# Patient Record
Sex: FEMALE | Race: WHITE | HISPANIC OR LATINO | Employment: FULL TIME | URBAN - METROPOLITAN AREA
[De-identification: names, ages, dates, MRNs, and addresses within clinical notes are randomized per-mention and may not be internally consistent; named-entity substitution may affect disease eponyms.]

---

## 2017-01-14 DIAGNOSIS — J30.1 SEASONAL ALLERGIC RHINITIS DUE TO POLLEN: ICD-10-CM

## 2017-01-16 ENCOUNTER — PATIENT MESSAGE (OUTPATIENT)
Dept: MEDICAL GROUP | Facility: CLINIC | Age: 47
End: 2017-01-16

## 2017-01-16 RX ORDER — MONTELUKAST SODIUM 10 MG/1
TABLET ORAL
Qty: 30 TAB | Refills: 6 | Status: SHIPPED | OUTPATIENT
Start: 2017-01-16 | End: 2017-09-09 | Stop reason: SDUPTHER

## 2017-01-23 ENCOUNTER — PATIENT MESSAGE (OUTPATIENT)
Dept: MEDICAL GROUP | Facility: CLINIC | Age: 47
End: 2017-01-23

## 2017-01-23 DIAGNOSIS — F90.9 ATTENTION DEFICIT DISORDER OF ADULT WITH HYPERACTIVITY: ICD-10-CM

## 2017-01-23 RX ORDER — DEXTROAMPHETAMINE SACCHARATE, AMPHETAMINE ASPARTATE, DEXTROAMPHETAMINE SULFATE AND AMPHETAMINE SULFATE 3.75; 3.75; 3.75; 3.75 MG/1; MG/1; MG/1; MG/1
15 TABLET ORAL 2 TIMES DAILY
Qty: 60 TAB | Refills: 0 | Status: CANCELLED | OUTPATIENT
Start: 2017-01-23

## 2017-01-23 RX ORDER — BUPROPION HYDROCHLORIDE 150 MG/1
150 TABLET ORAL EVERY MORNING
Qty: 30 TAB | Refills: 3 | Status: SHIPPED | OUTPATIENT
Start: 2017-01-23 | End: 2017-04-14 | Stop reason: SDUPTHER

## 2017-01-23 NOTE — TELEPHONE ENCOUNTER
Was the patient seen in the last year in this department? Yes . Last visit was 10/06/16. Pt had a visit Friday, 1/20/17 but had to reschedule to 2/1/17. Pt states she has been out since Saturday and is sick to her stomach. She wants something to help whether that's Adderall or something else to help with withdrawals.     Does patient have an active prescription for medications requested? No     Received Request Via: Patient

## 2017-01-23 NOTE — TELEPHONE ENCOUNTER
----- Message from Your Healthcare Team sent at 1/23/2017  2:17 PM PST -----  Regarding: Prescription Question  Contact: [phone]  I could not make my appointment on Friday to get Adderall refills and I ran completely out as of Saturday morning. The next available appointment wasn't some time until early February.   I was going to talk to you about going off of Adderall at Friday's appointment. I am really sick from not taking it for a couple days and I need to find out if there's something I can get to help me while I'm trying to go off of this.  Right now I cannot even function and I need to go to work tomorrow.

## 2017-01-23 NOTE — TELEPHONE ENCOUNTER
Please see below message from patient. I did send a message earlier to you because she had called earlier. Please advise, thank you.

## 2017-01-24 NOTE — TELEPHONE ENCOUNTER
From: Aruna Simth  To: Sivan Jefferson M.D.  Sent: 1/23/2017 5:53 PM PST  Subject: RE:substitute    And this will help with the withdrawal symptoms of shaking, rapid heartbeat, throwing up & diarrhea? I just want to make sure. Also, it is ok to take with the Serotonin?  ----- Message -----  From: Sivan Jefferson M.D.  Sent: 1/23/2017 5:16 PM PST  To: Aruna Smith  Subject: substitute    I suggest trying wellbutrin. In low to moderate dose it can be helpful like the adderall. In high dose it could give you anxiety. I have sent it to [facility].

## 2017-02-09 ENCOUNTER — OFFICE VISIT (OUTPATIENT)
Dept: URGENT CARE | Facility: PHYSICIAN GROUP | Age: 47
End: 2017-02-09
Payer: COMMERCIAL

## 2017-02-09 VITALS
HEART RATE: 79 BPM | TEMPERATURE: 98.8 F | HEIGHT: 64 IN | WEIGHT: 200 LBS | DIASTOLIC BLOOD PRESSURE: 86 MMHG | OXYGEN SATURATION: 94 % | SYSTOLIC BLOOD PRESSURE: 124 MMHG | BODY MASS INDEX: 34.15 KG/M2 | RESPIRATION RATE: 16 BRPM

## 2017-02-09 DIAGNOSIS — R11.2 NON-INTRACTABLE VOMITING WITH NAUSEA, UNSPECIFIED VOMITING TYPE: ICD-10-CM

## 2017-02-09 LAB
APPEARANCE UR: CLEAR
BILIRUB UR STRIP-MCNC: NORMAL MG/DL
COLOR UR AUTO: YELLOW
GLUCOSE UR STRIP.AUTO-MCNC: NORMAL MG/DL
KETONES UR STRIP.AUTO-MCNC: NORMAL MG/DL
LEUKOCYTE ESTERASE UR QL STRIP.AUTO: NORMAL
NITRITE UR QL STRIP.AUTO: NORMAL
PH UR STRIP.AUTO: 5 [PH] (ref 5–8)
PROT UR QL STRIP: NORMAL MG/DL
RBC UR QL AUTO: NORMAL
SP GR UR STRIP.AUTO: 1.03
UROBILINOGEN UR STRIP-MCNC: NORMAL MG/DL

## 2017-02-09 PROCEDURE — 99214 OFFICE O/P EST MOD 30 MIN: CPT | Performed by: EMERGENCY MEDICINE

## 2017-02-09 PROCEDURE — 81002 URINALYSIS NONAUTO W/O SCOPE: CPT | Performed by: EMERGENCY MEDICINE

## 2017-02-09 RX ORDER — ONDANSETRON 4 MG/1
4 TABLET, ORALLY DISINTEGRATING ORAL ONCE
Status: COMPLETED | OUTPATIENT
Start: 2017-02-09 | End: 2017-02-09

## 2017-02-09 RX ORDER — ONDANSETRON 4 MG/1
4 TABLET, ORALLY DISINTEGRATING ORAL EVERY 8 HOURS PRN
Qty: 10 TAB | Refills: 0 | Status: SHIPPED | OUTPATIENT
Start: 2017-02-09 | End: 2017-04-24 | Stop reason: SDUPTHER

## 2017-02-09 RX ADMIN — ONDANSETRON 4 MG: 4 TABLET, ORALLY DISINTEGRATING ORAL at 14:38

## 2017-02-09 ASSESSMENT — ENCOUNTER SYMPTOMS
DIARRHEA: 1
EYE DISCHARGE: 0
VOMITING: 1
MYALGIAS: 1
NECK PAIN: 0
CHILLS: 0
ABDOMINAL PAIN: 1
HEADACHES: 1
SENSORY CHANGE: 0
NAUSEA: 1
COUGH: 0
EYE PAIN: 0
NERVOUS/ANXIOUS: 0
FEVER: 1
HEMOPTYSIS: 0
CONSTIPATION: 0

## 2017-02-09 NOTE — MR AVS SNAPSHOT
"        Aruna Smith   2017 12:40 PM   Office Visit   MRN: [id number]    Department:  [facility]   Dept Phone:  [phone]    Description:  Female : 1970   Provider:  MELIDA Baca M.D.           Reason for Visit     N/V diarrhea, headache, body aches, sneezing fits, no appetite, nasal congestion, ear pressure, dizzyness and fatigue x3 days      Allergies as of 2017     Allergen Noted Reactions    Xanax [Alprazolam Xr] 2014       shaking      You were diagnosed with     Non-intractable vomiting with nausea, unspecified vomiting type   [[id number]]         Vital Signs     Blood Pressure Pulse Temperature Respirations Height Weight    124/86 mmHg 79 37.1 °C (98.8 °F) 16 1.626 m (5' 4.02\") 90.719 kg (200 lb)    Body Mass Index Oxygen Saturation Smoking Status             34.31 kg/m2 94% Former Smoker         Basic Information     Date Of Birth Sex Race Ethnicity Preferred Language    1970 Female White Non- English      Your appointments     2017  4:00 PM   Established Patient with Sivan Jefferson M.D.   [facility]    [address] [place] [address] [address] [place] [id number] [phone]           You will be receiving a confirmation call a few days before your appointment from our automated call confirmation system.              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 Review all of your home medications and newly ordered medications with your provider and/or pharmacist. Follow medication instructions as directed by your provider and/or pharmacist. Please keep your medication list with you and share with your provider. Update the information when medications are discontinued, doses are changed, or new medications (including over-the-counter products) are added; and carry medication information at all times in the event of emergency situations     Allergies:  XANAX - (reactions not documented)               Medications  Valid as of: February 09, 2017 -  2:09 PM    Generic Name Brand Name Tablet Size Instructions for use    Amphetamine-Dextroamphetamine (Tab) ADDERALL 15 MG Take 1 Tab by mouth 2 times a day. Seen 10/6/16, renewal of chronic medication, do not fill until 12/21/16        BuPROPion HCl (TABLET SR 24 HR) WELLBUTRIN  MG Take 1 Tab by mouth every morning.        Hydrocodone-Acetaminophen (Tab) NORCO 5-325 MG Take 1-2 Tabs by mouth every four hours as needed.        HydrOXYzine HCl (Tab) ATARAX 10 MG TAKE ONE TABLET BY MOUTH THREE TIMES A DAY AS NEEDED FOR ANXIETY    GENERIC FOR ATARAX        Loratadine   Take  by mouth.        LORazepam (Tab) ATIVAN 0.5 MG Take 1 Tab by mouth every 8 hours as needed for Anxiety.        Montelukast Sodium (Tab) SINGULAIR 10 MG TAKE ONE TABLET BY MOUTH DAILY        Ondansetron (TABLET DISPERSIBLE) ZOFRAN ODT 4 MG Take 1 Tab by mouth every 8 hours as needed for Nausea/Vomiting.        RaNITidine HCl (Tab)  ZANTAC 150 MG TAKE ONE TABLET BY MOUTH TWICE A DAY        Sertraline HCl (Tab) ZOLOFT 50 MG Take 1 Tab by mouth every day.        .                 Medicines prescribed today were sent to:     [id number] [place] [address] [place] [address]    Phone: [phone] Fax: [phone]    Open 24 Hours?: No      Medication refill instructions:       If your prescription bottle indicates you have medication refills left, it is not necessary to call your provider’s office. Please contact your pharmacy and they will refill your medication.    If your prescription bottle indicates you do not have any refills left, you may request refills at any time through one of the following ways: The online [employer] system (except Urgent Care), by calling your provider’s office, or by asking your pharmacy to contact your provider’s office with a refill request. Medication refills are processed only during regular business hours and may not be available until the next business day. Your provider may request additional information or to have a follow-up visit with you prior to refilling your medication.   *Please Note: Medication refills are assigned a new Rx number when refilled electronically. Your pharmacy may indicate that no refills were authorized even though a new prescription for the same medication is available at the pharmacy. Please request the medicine by name with the pharmacy before contacting your provider for a refill.        Your To Do List     Future Labs/Procedures Complete By Expires    CULTURE STOOL  As directed 2/9/2018         [employer] Access Code: Activation code not generated  Current [employer] Status: Active

## 2017-02-09 NOTE — Clinical Note
February 9, 2017        Aruna Smith  [address] [place] [address]        Dear Aruna:    Please ask to be allowed to stay home from  work for the next two days,    If you have any questions or concerns, please don't hesitate to call.        Sincerely,        MELIDA Baca M.D.    Electronically Signed

## 2017-02-09 NOTE — PROGRESS NOTES
Subjective:      Aruna Smith is a 46 y.o. female who presents with N/V            Gastroenteritis   This is a new problem. The current episode started in the past 7 days. The problem has been gradually worsening. The emesis has an appearance of bile. There has been no fever. Associated symptoms include abdominal pain, diarrhea, a fever, headaches and myalgias. Pertinent negatives include no chills or coughing. Associated symptoms comments: Diarrhea before vomiting,  with diarrhea. Patient has associated headaches and bodyaches here pain lack of appetite.. Risk factors include ill contacts ( with diarrhea). She has tried increased fluids for the symptoms.     Allergies   Allergen Reactions   • Xanax [Alprazolam Xr]      shaking     Social History     Social History   • Marital Status:      Spouse Name: N/A   • Number of Children: N/A   • Years of Education: N/A     Occupational History   • Not on file.     Social History Main Topics   • Smoking status: Former Smoker -- 0.50 packs/day for 4 years     Types: Cigarettes     Quit date: 09/01/1988   • Smokeless tobacco: Never Used   • Alcohol Use: 0.0 oz/week     0 Standard drinks or equivalent per week      Comment: occasional   • Drug Use: No   • Sexual Activity: Not on file     Other Topics Concern   • Not on file     Social History Narrative     Past Medical History   Diagnosis Date   • Allergic rhinitis    • Anxiety    • Seasonal allergies    • Family history of diabetes mellitus type II    • Other specified symptom associated with female genital organs    • Attention deficit disorder of adult with hyperactivity    • Shingles 5/2014   • GERD without esophagitis    • Colitis, indeterminate    ..    Tinnitis  Current Outpatient Prescriptions on File Prior to Visit   Medication Sig Dispense Refill   • buPROPion (WELLBUTRIN XL) 150 MG XL tablet Take 1 Tab by mouth every morning. 30 Tab 3   • montelukast (SINGULAIR) 10 MG Tab TAKE ONE TABLET BY  "MOUTH DAILY 30 Tab 6   • ranitidine (ZANTAC) 150 MG Tab TAKE ONE TABLET BY MOUTH TWICE A DAY 60 Tab 6   • sertraline (ZOLOFT) 50 MG Tab Take 1 Tab by mouth every day. 30 Tab 6   • hydrOXYzine (ATARAX) 10 MG Tab TAKE ONE TABLET BY MOUTH THREE TIMES A DAY AS NEEDED FOR ANXIETY    GENERIC FOR ATARAX 90 Tab 4   • lorazepam (ATIVAN) 0.5 MG Tab Take 1 Tab by mouth every 8 hours as needed for Anxiety. 60 Tab 5   • hydrocodone-acetaminophen (NORCO) 5-325 MG Tab per tablet Take 1-2 Tabs by mouth every four hours as needed. 8 Tab 0   • amphetamine-dextroamphetamine (ADDERALL) 15 MG tablet Take 1 Tab by mouth 2 times a day. Seen 10/6/16, renewal of chronic medication, do not fill until 12/21/16 60 Tab 0     No current facility-administered medications on file prior to visit.   family history includes Diabetes in her mother; Heart Disease in her father; Hypertension in her brother, maternal grandfather, and mother.    Review of Systems   Constitutional: Positive for fever. Negative for chills and malaise/fatigue.   HENT: Positive for congestion.    Eyes: Negative for pain and discharge.   Respiratory: Negative for cough and hemoptysis.    Gastrointestinal: Positive for nausea, vomiting, abdominal pain and diarrhea. Negative for constipation.   Genitourinary: Negative.    Musculoskeletal: Positive for myalgias. Negative for neck pain.   Neurological: Positive for headaches. Negative for sensory change.   Psychiatric/Behavioral: The patient is not nervous/anxious.           Objective:     /86 mmHg  Pulse 79  Temp(Src) 37.1 °C (98.8 °F)  Resp 16  Ht 1.626 m (5' 4.02\")  Wt 90.719 kg (200 lb)  BMI 34.31 kg/m2  SpO2 94%     Physical Exam   Constitutional: She appears well-developed and well-nourished.   HENT:   Head: Normocephalic and atraumatic.   Right Ear: External ear normal.   Left Ear: External ear normal.   TMs appears normal.   Eyes: Right eye exhibits no discharge. Left eye exhibits no discharge.   Neck: No " thyromegaly present.   Cardiovascular: Normal rate and regular rhythm.    Pulmonary/Chest: Effort normal. No respiratory distress. She has no wheezes. She has no rales.   Abdominal: She exhibits no distension. There is no tenderness.   Musculoskeletal: Normal range of motion.   Neurological: She is alert.   Skin: She is not diaphoretic.   Psychiatric: She has a normal mood and affect. Her behavior is normal.   Vitals reviewed.              Assessment/Plan:     Diagnosis acute gastroenteritis    Patient will stand clear liquids for the next 24 hours of dairy products for 5 days. She's been given a prescription: Zofran 4 mg ODT patient able to hold down liquids after the medicine. She will also be given a prescription to use on a regular basis if her symptoms persist. She will return for reevaluation greenish stool in the of her diarrhea persists will call her with results. Patient was given a work note for the next 2 days off.

## 2017-02-25 RX ORDER — LORAZEPAM 0.5 MG/1
TABLET ORAL
Qty: 60 TAB | Refills: 3 | Status: SHIPPED
Start: 2017-02-25 | End: 2017-04-14 | Stop reason: SDUPTHER

## 2017-03-27 RX ORDER — HYDROXYZINE HYDROCHLORIDE 10 MG/1
TABLET, FILM COATED ORAL
Qty: 90 TAB | Refills: 0 | Status: SHIPPED | OUTPATIENT
Start: 2017-03-27 | End: 2018-08-23

## 2017-04-14 ENCOUNTER — OFFICE VISIT (OUTPATIENT)
Dept: MEDICAL GROUP | Facility: CLINIC | Age: 47
End: 2017-04-14
Payer: COMMERCIAL

## 2017-04-14 VITALS
OXYGEN SATURATION: 95 % | SYSTOLIC BLOOD PRESSURE: 136 MMHG | HEART RATE: 70 BPM | RESPIRATION RATE: 16 BRPM | HEIGHT: 64 IN | WEIGHT: 206 LBS | DIASTOLIC BLOOD PRESSURE: 80 MMHG | BODY MASS INDEX: 35.17 KG/M2 | TEMPERATURE: 98.2 F

## 2017-04-14 DIAGNOSIS — E66.9 OBESITY, CLASS II, BMI 35-39.9: ICD-10-CM
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
## 2017-04-14 DIAGNOSIS — F41.9 ANXIETY: ICD-10-CM

## 2017-04-14 DIAGNOSIS — S61.258A CAT BITE OF INDEX FINGER, INITIAL ENCOUNTER: ICD-10-CM

## 2017-04-14 DIAGNOSIS — F90.9 ATTENTION DEFICIT DISORDER OF ADULT WITH HYPERACTIVITY: ICD-10-CM

## 2017-04-14 PROCEDURE — 99213 OFFICE O/P EST LOW 20 MIN: CPT | Performed by: FAMILY MEDICINE

## 2017-04-14 RX ORDER — BUPROPION HYDROCHLORIDE 150 MG/1
150 TABLET ORAL EVERY MORNING
Qty: 30 TAB | Refills: 6 | Status: SHIPPED | OUTPATIENT
Start: 2017-04-14 | End: 2017-04-20 | Stop reason: SDUPTHER

## 2017-04-14 RX ORDER — LORAZEPAM 0.5 MG/1
0.5 TABLET ORAL EVERY 8 HOURS PRN
Qty: 60 TAB | Refills: 5 | Status: SHIPPED | OUTPATIENT
Start: 2017-04-14 | End: 2017-06-10 | Stop reason: SDUPTHER

## 2017-04-14 ASSESSMENT — PATIENT HEALTH QUESTIONNAIRE - PHQ9: CLINICAL INTERPRETATION OF PHQ2 SCORE: 1

## 2017-04-14 NOTE — PROGRESS NOTES
CC: Bite, ADHD, medication renewal, anxiety    HPI:   Aruna presents today with the following.    1. Cat bite of index finger, initial encounter  Was 3/23/17, her own immunized cat.  The cat has not been ill.  Her tetanus was up to date.  She has recovered well except for the right index finger.  This is still swollen and lacking full ROM but is improving.  She states at one point she could not even bend the finger. Now she can bend it almost completely. Denies drainage.    2. Attention deficit disorder of adult with hyperactivity  The bupropion has been helpful.  It is working for her.      3. Anxiety  This is a chronic problem. This has been worse this last year with the passing of her mother who she was very close to.  She feels she is doing better and finds her family and her Scientologist to be very good support. Denies depression. Feels she is progressing with her grief.    4. Obesity, Class II, BMI 35-39.9 (CMS-[facility])  She is still much lower than her her highest weight. She finds she is slipping and plateauing and is planning on increasing her exercise.        Patient Active Problem List    Diagnosis Date Noted   • GERD without esophagitis    • Colitis, indeterminate    • Obesity, Class II, BMI 35-39.9 (CMS-[facility]) 07/30/2014   • Attention deficit disorder of adult with hyperactivity    • Family history of diabetes mellitus type II    • Seasonal allergic rhinitis    • Seasonal allergies    • Anxiety        Current Outpatient Prescriptions   Medication Sig Dispense Refill   • buPROPion (WELLBUTRIN XL) 150 MG XL tablet Take 1 Tab by mouth every morning. 30 Tab 6   • lorazepam (ATIVAN) 0.5 MG Tab Take 1 Tab by mouth every 8 hours as needed for Anxiety. 60 Tab 5   • hydrOXYzine (ATARAX) 10 MG Tab TAKE ONE TABLET BY MOUTH THREE TIMES A DAY AS NEEDED FOR ANXIETY   (GENERIC ATARAX) 90 Tab 0   • Loratadine (CLARITIN PO) Take  by mouth.     • ondansetron (ZOFRAN ODT) 4 MG TABLET DISPERSIBLE Take 1 Tab by mouth every 8 hours as  "needed for Nausea/Vomiting. 10 Tab 0   • montelukast (SINGULAIR) 10 MG Tab TAKE ONE TABLET BY MOUTH DAILY 30 Tab 6   • ranitidine (ZANTAC) 150 MG Tab TAKE ONE TABLET BY MOUTH TWICE A DAY 60 Tab 6   • sertraline (ZOLOFT) 50 MG Tab Take 1 Tab by mouth every day. 30 Tab 6     No current facility-administered medications for this visit.         Allergies as of 04/14/2017 - Josué as Reviewed 04/14/2017   Allergen Reaction Noted   • Xanax [alprazolam xr]  05/09/2014        ROS: As per HPI.    /80 mmHg  Pulse 70  Temp(Src) 36.8 °C (98.2 °F)  Resp 16  Ht 1.626 m (5' 4.02\")  Wt 93.441 kg (206 lb)  BMI 35.34 kg/m2  SpO2 95%    Physical Exam:  Gen:         Alert and oriented, No apparent distress.  Lucid and fluent.  Neck:       Full range of motion, no JVD or carotid bruits appreciated.   Lungs:     Clear to auscultation bilaterally  CV:          Regular rate and rhythm. No murmurs, rubs or gallops.               Ext:          No clubbing, cyanosis, edema.  Right index finger violaceous fusiform swelling, ROM is nearly full, patient states this is much worse.      Assessment and Plan.   46 y.o. female with the following issues.    1. Cat bite of index finger, initial encounter  This appears to be stable and not currently infected though there is inflammation. Per the history this is improving. She will continue to work on range of motion and will let me know if the redness or swelling worsens or if she gets increased pain.    2. Attention deficit disorder of adult with hyperactivity  This medication has been very successful. She is glad to no longer be on the controlled substances. Discussed that seeing each other every 6 months since she is doing well would be appropriate.  - buPROPion (WELLBUTRIN XL) 150 MG XL tablet; Take 1 Tab by mouth every morning.  Dispense: 30 Tab; Refill: 6    3. Anxiety  The medication continues to be very helpful. She tries to average 1-1/2 a day but has been on this regimen for a long " time. Denies rebound anxiety or depression associated with the medication  - lorazepam (ATIVAN) 0.5 MG Tab; Take 1 Tab by mouth every 8 hours as needed for Anxiety.  Dispense: 60 Tab; Refill: 5    4. Obesity, Class II, BMI 35-39.9 (CMS-[facility])  She is getting back to exercising.    - Patient identified as having weight management issue.  Appropriate orders and counseling given.    Health maintenance is reviewed, the mammogram is scheduled

## 2017-04-20 ENCOUNTER — PATIENT MESSAGE (OUTPATIENT)
Dept: MEDICAL GROUP | Facility: CLINIC | Age: 47
End: 2017-04-20

## 2017-04-20 DIAGNOSIS — F90.9 ATTENTION DEFICIT DISORDER OF ADULT WITH HYPERACTIVITY: ICD-10-CM

## 2017-04-20 RX ORDER — BUPROPION HYDROCHLORIDE 300 MG/1
300 TABLET ORAL EVERY MORNING
Qty: 30 TAB | Refills: 6 | Status: SHIPPED | OUTPATIENT
Start: 2017-04-20 | End: 2017-11-18 | Stop reason: SDUPTHER

## 2017-04-20 NOTE — TELEPHONE ENCOUNTER
From: Aruna Smith  To: Sivan Jefferson M.D.  Sent: 4/20/2017 12:50 PM PDT  Subject: RE:symptoms certainly suggest depression    Yes I keep hoping I won't wake up.  ----- Message -----  From: Sivan Jefferson M.D.  Sent: 4/20/2017 10:26 AM PDT  To: Aruna Smith  Subject: symptoms certainly suggest depression    Those symptoms certainly can be seen in depression. How long has this been going on? Are you getting any thoughts that are scaring you?

## 2017-04-21 NOTE — TELEPHONE ENCOUNTER
From: Aruna Smith  To: Sivan Jefferson M.D.  Sent: 4/20/2017 5:22 PM PDT  Subject: RE:letter    ?Is it possible just to write it here in a message. I can print it off here.  ----- Message -----  From: Sivan Jefferson M.D.  Sent: 4/20/2017 2:33 PM PDT  To: Aruna Smith  Subject: letter    I have written a letter and will sign it. Not quite sure how to fax it over to [place] and get it to the , but we will figure this out.

## 2017-04-24 ENCOUNTER — OFFICE VISIT (OUTPATIENT)
Dept: MEDICAL GROUP | Facility: CLINIC | Age: 47
End: 2017-04-24
Payer: COMMERCIAL

## 2017-04-24 ENCOUNTER — HOSPITAL ENCOUNTER (OUTPATIENT)
Facility: MEDICAL CENTER | Age: 47
End: 2017-04-24
Attending: NURSE PRACTITIONER
Payer: COMMERCIAL

## 2017-04-24 ENCOUNTER — PATIENT MESSAGE (OUTPATIENT)
Dept: MEDICAL GROUP | Facility: CLINIC | Age: 47
End: 2017-04-24

## 2017-04-24 VITALS
SYSTOLIC BLOOD PRESSURE: 130 MMHG | DIASTOLIC BLOOD PRESSURE: 82 MMHG | HEART RATE: 80 BPM | RESPIRATION RATE: 14 BRPM | BODY MASS INDEX: 33.63 KG/M2 | HEIGHT: 64 IN | WEIGHT: 197 LBS | TEMPERATURE: 98.8 F | OXYGEN SATURATION: 95 %

## 2017-04-24 DIAGNOSIS — K52.3 COLITIS, INDETERMINATE: ICD-10-CM

## 2017-04-24 DIAGNOSIS — R11.2 NAUSEA AND VOMITING, INTRACTABILITY OF VOMITING NOT SPECIFIED, UNSPECIFIED VOMITING TYPE: ICD-10-CM

## 2017-04-24 LAB
APPEARANCE UR: CLEAR
BILIRUB UR STRIP-MCNC: NORMAL MG/DL
COLOR UR AUTO: YELLOW
GLUCOSE UR STRIP.AUTO-MCNC: NEGATIVE MG/DL
KETONES UR STRIP.AUTO-MCNC: 80 MG/DL
LEUKOCYTE ESTERASE UR QL STRIP.AUTO: NEGATIVE
NITRITE UR QL STRIP.AUTO: NEGATIVE
PH UR STRIP.AUTO: 7 [PH] (ref 5–8)
PROT UR QL STRIP: NEGATIVE MG/DL
RBC UR QL AUTO: NORMAL
SP GR UR STRIP.AUTO: 1.02
UROBILINOGEN UR STRIP-MCNC: NEGATIVE MG/DL

## 2017-04-24 PROCEDURE — 81002 URINALYSIS NONAUTO W/O SCOPE: CPT | Performed by: NURSE PRACTITIONER

## 2017-04-24 PROCEDURE — 81001 URINALYSIS AUTO W/SCOPE: CPT

## 2017-04-24 PROCEDURE — 99214 OFFICE O/P EST MOD 30 MIN: CPT | Performed by: NURSE PRACTITIONER

## 2017-04-24 PROCEDURE — 87086 URINE CULTURE/COLONY COUNT: CPT

## 2017-04-24 RX ORDER — ONDANSETRON 4 MG/1
4 TABLET, ORALLY DISINTEGRATING ORAL EVERY 8 HOURS PRN
Qty: 10 TAB | Refills: 1 | Status: SHIPPED | OUTPATIENT
Start: 2017-04-24 | End: 2017-11-30

## 2017-04-24 NOTE — MR AVS SNAPSHOT
"        Aruna Banegasn   2017 2:20 PM   Office Visit   MRN: [id number]    Department:  [facility]   Dept Phone:  [phone]    Description:  Female : 1970   Provider:  GAYATHRI Jacobsen           Reason for Visit     Emesis nausea and vomiting x friday/saturday      Allergies as of 2017     Allergen Noted Reactions    Xanax [Alprazolam Xr] 2014       shaking      You were diagnosed with     Nausea and vomiting, intractability of vomiting not specified, unspecified vomiting type   [[id number]]         Vital Signs     Blood Pressure Pulse Temperature Respirations Height Weight    130/82 mmHg 80 37.1 °C (98.8 °F) 14 1.626 m (5' 4.02\") 89.359 kg (197 lb)    Body Mass Index Oxygen Saturation Breastfeeding? Smoking Status          33.80 kg/m2 95% No Former Smoker        Basic Information     Date Of Birth Sex Race Ethnicity Preferred Language    1970 Female White Non- English      Your appointments     May 17, 2017  3:40 PM   MA [id number] with [facility] MG 3   [facility] ([address])    [address]  [facility] [address] [phone]           No deodorant, powder, perfume or lotion under the arm or breast area.              Problem List              ICD-10-CM Priority Class Noted - Resolved    Anxiety F41.9   Unknown - Present    Seasonal allergic rhinitis J30.2   Unknown - Present    Seasonal allergies J30.2   Unknown - Present    Family history of diabetes mellitus type II Z83.3   Unknown - Present    Attention deficit disorder of adult with hyperactivity F90.0   Unknown - Present    Obesity, Class II, BMI 35-39.9 (CMS-[facility]) E66.01   2014 - Present    GERD without esophagitis K21.9   Unknown - Present    Colitis, indeterminate K52.3   Unknown - Present      Health Maintenance        Date Due Completion Dates    MAMMOGRAM 2015, 2011, 3/9/2010, 3/9/2010, 2009, 2009, 2009    IMM DTaP/Tdap/Td Vaccine " (2 - Td) 2/21/2024 2/21/2014    COLONOSCOPY 4/19/2026 4/19/2016            Results     POCT Urinalysis      Component Value Standard Range & Units    POC Color Yellow Negative    POC Appearance clear Negative    POC Leukocyte Esterase Negative Negative    POC Nitrites Negative Negative    POC Urobiligen Negative Negative (0.2) mg/dL    POC Protein Negative Negative mg/dL    POC Urine PH 7.0 5.0 - 8.0    POC Blood Small Negative    POC Specific Gravity 1.020 <1.005 - >1.030    POC Ketones 80 Negative mg/dL    POC Biliruben Moderate Negative mg/dL    POC Glucose Negative Negative mg/dL                        Current Immunizations     Influenza Vaccine Pediatric 10/7/2010    Influenza Vaccine Quad Inj (Pf) 12/15/2015, 10/14/2014    Tdap Vaccine 2/21/2014      Below and/or attached are the medications your provider expects you to take. Review all of your home medications and newly ordered medications with your provider and/or pharmacist. Follow medication instructions as directed by your provider and/or pharmacist. Please keep your medication list with you and share with your provider. Update the information when medications are discontinued, doses are changed, or new medications (including over-the-counter products) are added; and carry medication information at all times in the event of emergency situations     Allergies:  XANAX - (reactions not documented)               Medications  Valid as of: April 24, 2017 -  4:14 PM    Generic Name Brand Name Tablet Size Instructions for use    BuPROPion HCl (TABLET SR 24 HR) WELLBUTRIN  MG Take 1 Tab by mouth every morning.        HydrOXYzine HCl (Tab) ATARAX 10 MG TAKE ONE TABLET BY MOUTH THREE TIMES A DAY AS NEEDED FOR ANXIETY   (GENERIC ATARAX)        Loratadine   Take  by mouth.        LORazepam (Tab) ATIVAN 0.5 MG Take 1 Tab by mouth every 8 hours as needed for Anxiety.        Montelukast Sodium (Tab) SINGULAIR 10 MG TAKE ONE TABLET BY MOUTH DAILY        Ondansetron  (TABLET DISPERSIBLE) ZOFRAN ODT 4 MG Take 1 Tab by mouth every 8 hours as needed for Nausea/Vomiting.        RaNITidine HCl (Tab) ZANTAC 150 MG TAKE ONE TABLET BY MOUTH TWICE A DAY        Sertraline HCl (Tab) ZOLOFT 50 MG Take 1 Tab by mouth every day.        .                 Medicines prescribed today were sent to:     [id number] - [place] - [address] [place] [address]    Phone: [phone] Fax: [phone]    Open 24 Hours?: No      Medication refill instructions:       If your prescription bottle indicates you have medication refills left, it is not necessary to call your provider’s office. Please contact your pharmacy and they will refill your medication.    If your prescription bottle indicates you do not have any refills left, you may request refills at any time through one of the following ways: The online [employer] system (except Urgent Care), by calling your provider’s office, or by asking your pharmacy to contact your provider’s office with a refill request. Medication refills are processed only during regular business hours and may not be available until the next business day. Your provider may request additional information or to have a follow-up visit with you prior to refilling your medication.   *Please Note: Medication refills are assigned a new Rx number when refilled electronically. Your pharmacy may indicate that no refills were authorized even though a new prescription for the same medication is available at the pharmacy. Please request the medicine by name with the pharmacy before contacting your provider for a refill.        Your To Do List     Future Labs/Procedures Complete By Expires    URINALYSIS,CULTURE IF INDICATED  As directed 4/25/2018         [employer] Access Code: Activation code not generated  Current [employer] Status: Active

## 2017-04-25 LAB
AMBIGUOUS DTTM AMBI4: NORMAL
APPEARANCE UR: ABNORMAL
BACTERIA #/AREA URNS HPF: ABNORMAL /HPF
BILIRUB UR QL STRIP.AUTO: NEGATIVE
CAOX CRY #/AREA URNS HPF: ABNORMAL /HPF
COLOR UR: YELLOW
CULTURE IF INDICATED INDCX: YES UA CULTURE
EPI CELLS #/AREA URNS HPF: ABNORMAL /HPF
GLUCOSE UR STRIP.AUTO-MCNC: NEGATIVE MG/DL
KETONES UR STRIP.AUTO-MCNC: 10 MG/DL
LEUKOCYTE ESTERASE UR QL STRIP.AUTO: ABNORMAL
MICRO URNS: ABNORMAL
MUCOUS THREADS #/AREA URNS HPF: ABNORMAL /HPF
NITRITE UR QL STRIP.AUTO: NEGATIVE
PH UR STRIP.AUTO: 5.5 [PH]
PROT UR QL STRIP: 30 MG/DL
RBC # URNS HPF: ABNORMAL /HPF
RBC UR QL AUTO: NEGATIVE
SIGNIFICANT IND 70042: NORMAL
SITE SITE: NORMAL
SOURCE SOURCE: NORMAL
SP GR UR STRIP.AUTO: 1.02
WBC #/AREA URNS HPF: ABNORMAL /HPF

## 2017-04-26 ENCOUNTER — TELEPHONE (OUTPATIENT)
Dept: MEDICAL GROUP | Facility: CLINIC | Age: 47
End: 2017-04-26

## 2017-04-26 DIAGNOSIS — R31.9 URINARY TRACT INFECTION WITH HEMATURIA, SITE UNSPECIFIED: ICD-10-CM

## 2017-04-26 DIAGNOSIS — N39.0 URINARY TRACT INFECTION WITH HEMATURIA, SITE UNSPECIFIED: ICD-10-CM

## 2017-04-26 RX ORDER — NITROFURANTOIN 25; 75 MG/1; MG/1
100 CAPSULE ORAL 2 TIMES DAILY
Qty: 14 CAP | Refills: 0 | Status: SHIPPED | OUTPATIENT
Start: 2017-04-26 | End: 2017-05-03

## 2017-04-26 NOTE — Clinical Note
April 27, 2017        Aruna Smith  [address] [place] [address]        Dear Aruna:    We are sending you this letter regarding your urine culture results because we were unsuccessful in reaching you by phone. Your urine culture looks positive for a mild bladder infection and antibiotics were sent to your pharmacy, [facility] [place]. Please take 1 capsule by mouth twice a day for 7 days.     If you have any questions or concerns, please don't hesitate to call.        Sincerely,        ROBERT Jacobsen.    Electronically Signed

## 2017-04-27 LAB
BACTERIA UR CULT: NORMAL
SIGNIFICANT IND 70042: NORMAL
SITE SITE: NORMAL
SOURCE SOURCE: NORMAL

## 2017-04-27 NOTE — TELEPHONE ENCOUNTER
Patient called back left a message to leave her a message with any information we need to tell her. She know she has antibiotics at the pharmacy. Called her back voicemail is full unable to leave a message.

## 2017-04-27 NOTE — TELEPHONE ENCOUNTER
Please call patient. Urine culture looks positive for mild your bladder infection. Antibiotics sent to her pharmacy

## 2017-04-28 ENCOUNTER — TELEPHONE (OUTPATIENT)
Dept: MEDICAL GROUP | Facility: CLINIC | Age: 47
End: 2017-04-28

## 2017-04-28 ENCOUNTER — PATIENT MESSAGE (OUTPATIENT)
Dept: MEDICAL GROUP | Facility: CLINIC | Age: 47
End: 2017-04-28

## 2017-04-28 NOTE — TELEPHONE ENCOUNTER
"VOICEMAIL  1. Caller Name: Aruna Smith                      Call Back Number: [phone] (home)       2. Message: \"(MACROBID) 100 MG Cap is making me sick, I am vomiting this is not working, could you please change it.\"    3. Patient approves office to leave a detailed voicemail/MyChart message: yes      "

## 2017-04-29 ENCOUNTER — PATIENT MESSAGE (OUTPATIENT)
Dept: MEDICAL GROUP | Facility: CLINIC | Age: 47
End: 2017-04-29

## 2017-04-29 NOTE — TELEPHONE ENCOUNTER
Message was sent via my chart to patient yesterday. She does not need to be on any antibiotics as the urine culture is negative.

## 2017-04-29 NOTE — TELEPHONE ENCOUNTER
From: Aruna Smith  To: Sivan Jefferson M.D.  Sent: 4/29/2017 5:59 AM PDT  Subject: RE:please discontinue the nitrofurantoin    Okay I will. Do you know why this was prescribed for me? What does the test that shows moderate bacteria mean?  ----- Message -----  From: Sivan Jefferson M.D.  Sent: 4/28/2017 4:42 PM PDT  To: Aruna Smith  Subject: please discontinue the nitrofurantoin    Please stop it. The urine culture does not show infection anyway.

## 2017-05-01 ENCOUNTER — PATIENT MESSAGE (OUTPATIENT)
Dept: MEDICAL GROUP | Facility: CLINIC | Age: 47
End: 2017-05-01

## 2017-05-01 DIAGNOSIS — R11.0 NAUSEA: ICD-10-CM

## 2017-05-01 DIAGNOSIS — R14.0 POSTPRANDIAL ABDOMINAL BLOATING: ICD-10-CM

## 2017-05-01 DIAGNOSIS — R10.11 RIGHT UPPER QUADRANT ABDOMINAL PAIN: ICD-10-CM

## 2017-05-01 NOTE — TELEPHONE ENCOUNTER
From: Aruna Smith  To: Sivan Jefferson M.D.  Sent: 4/30/2017 6:49 PM PDT  Subject: RE:it was prescribed because there was a little blood in the urine    Should I call the office for an appointment in two weeks? I guess I'm curious because I looked up issues related to this and the one that specifically caught my eye was Cholecystitis only because I do get pain in my gallbladder area and abdominal cramping and I've had a pain in my shoulder I haven't been able to get rid of for awhile. It was just weird that these exact symptoms match. Just a thought. I will call the office to schedule an appointment unless you direct me otherwise.  ----- Message -----  From: Sivan Jefferson M.D.  Sent: 4/29/2017 11:00 AM PDT  To: Aruna Smith  Subject: it was prescribed because there was a little blood in the urine    There was a little blood in the urinalysis that was done in the office. This was suspicious for infection. The culture which took several days to grow did not confirm an infection. You do have bilirubin in the urine which is not a normal finding. I would like her to be sure that you stay very well hydrated. I think we should check the urine and blood test again in about 2 weeks to check your liver.

## 2017-05-03 ENCOUNTER — PATIENT MESSAGE (OUTPATIENT)
Dept: MEDICAL GROUP | Facility: CLINIC | Age: 47
End: 2017-05-03

## 2017-05-03 DIAGNOSIS — M54.2 NECK PAIN: ICD-10-CM

## 2017-05-03 DIAGNOSIS — M62.838 NECK MUSCLE SPASM: ICD-10-CM

## 2017-05-03 DIAGNOSIS — M25.511 ACUTE PAIN OF RIGHT SHOULDER: ICD-10-CM

## 2017-05-03 RX ORDER — CYCLOBENZAPRINE HCL 5 MG
5-10 TABLET ORAL 3 TIMES DAILY PRN
Qty: 40 TAB | Refills: 0 | Status: SHIPPED
Start: 2017-05-03 | End: 2017-05-24 | Stop reason: SDUPTHER

## 2017-05-23 ENCOUNTER — PATIENT MESSAGE (OUTPATIENT)
Dept: MEDICAL GROUP | Facility: CLINIC | Age: 47
End: 2017-05-23

## 2017-05-23 ENCOUNTER — HOSPITAL ENCOUNTER (OUTPATIENT)
Dept: RADIOLOGY | Facility: MEDICAL CENTER | Age: 47
End: 2017-05-23
Attending: FAMILY MEDICINE
Payer: COMMERCIAL

## 2017-05-23 DIAGNOSIS — R10.11 RIGHT UPPER QUADRANT ABDOMINAL PAIN: ICD-10-CM

## 2017-05-23 DIAGNOSIS — R14.0 POSTPRANDIAL ABDOMINAL BLOATING: ICD-10-CM

## 2017-05-23 DIAGNOSIS — R11.0 NAUSEA: ICD-10-CM

## 2017-05-23 PROCEDURE — 76705 ECHO EXAM OF ABDOMEN: CPT

## 2017-05-24 DIAGNOSIS — M62.838 NECK MUSCLE SPASM: ICD-10-CM

## 2017-05-24 DIAGNOSIS — M54.2 NECK PAIN: ICD-10-CM

## 2017-05-24 DIAGNOSIS — M25.511 ACUTE PAIN OF RIGHT SHOULDER: ICD-10-CM

## 2017-05-24 RX ORDER — CYCLOBENZAPRINE HCL 5 MG
5-10 TABLET ORAL 3 TIMES DAILY PRN
Qty: 40 TAB | Refills: 0 | Status: SHIPPED
Start: 2017-05-24 | End: 2017-06-10 | Stop reason: SDUPTHER

## 2017-05-31 ENCOUNTER — PATIENT MESSAGE (OUTPATIENT)
Dept: MEDICAL GROUP | Facility: CLINIC | Age: 47
End: 2017-05-31

## 2017-06-10 DIAGNOSIS — F41.9 ANXIETY: ICD-10-CM

## 2017-06-10 RX ORDER — CYCLOBENZAPRINE HCL 5 MG
5 TABLET ORAL 3 TIMES DAILY PRN
Qty: 40 TAB | Refills: 2 | Status: SHIPPED
Start: 2017-06-10 | End: 2017-08-10 | Stop reason: SDUPTHER

## 2017-06-10 RX ORDER — LORAZEPAM 0.5 MG/1
0.5 TABLET ORAL EVERY 8 HOURS PRN
Qty: 60 TAB | Refills: 2 | Status: SHIPPED
Start: 2017-06-10 | End: 2018-01-19 | Stop reason: SDUPTHER

## 2017-06-10 RX ORDER — LORAZEPAM 0.5 MG/1
TABLET ORAL
Qty: 60 TAB | Refills: 2 | Status: SHIPPED
Start: 2017-06-10 | End: 2017-06-10 | Stop reason: SDUPTHER

## 2017-06-10 RX ORDER — CYCLOBENZAPRINE HCL 5 MG
TABLET ORAL
Qty: 40 TAB | Refills: 2 | Status: SHIPPED
Start: 2017-06-10 | End: 2017-06-10 | Stop reason: SDUPTHER

## 2017-06-15 ENCOUNTER — APPOINTMENT (OUTPATIENT)
Dept: RADIOLOGY | Facility: MEDICAL CENTER | Age: 47
End: 2017-06-15
Attending: FAMILY MEDICINE
Payer: COMMERCIAL

## 2017-06-20 ENCOUNTER — OFFICE VISIT (OUTPATIENT)
Dept: URGENT CARE | Facility: PHYSICIAN GROUP | Age: 47
End: 2017-06-20
Payer: COMMERCIAL

## 2017-06-20 VITALS
HEIGHT: 64 IN | WEIGHT: 203 LBS | RESPIRATION RATE: 16 BRPM | BODY MASS INDEX: 34.66 KG/M2 | TEMPERATURE: 97.2 F | OXYGEN SATURATION: 96 % | SYSTOLIC BLOOD PRESSURE: 122 MMHG | DIASTOLIC BLOOD PRESSURE: 92 MMHG | HEART RATE: 78 BPM

## 2017-06-20 DIAGNOSIS — J34.89 NASAL CONGESTION WITH RHINORRHEA: ICD-10-CM

## 2017-06-20 DIAGNOSIS — R05.8 UNPRODUCTIVE COUGH: ICD-10-CM

## 2017-06-20 DIAGNOSIS — R09.81 NASAL CONGESTION WITH RHINORRHEA: ICD-10-CM

## 2017-06-20 PROCEDURE — 99214 OFFICE O/P EST MOD 30 MIN: CPT | Performed by: NURSE PRACTITIONER

## 2017-06-20 RX ORDER — DEXTROMETHORPHAN HYDROBROMIDE AND PROMETHAZINE HYDROCHLORIDE 15; 6.25 MG/5ML; MG/5ML
5 SYRUP ORAL 4 TIMES DAILY PRN
Qty: 120 ML | Refills: 0 | Status: SHIPPED | OUTPATIENT
Start: 2017-06-20 | End: 2017-11-30

## 2017-06-20 RX ORDER — BENZONATATE 100 MG/1
100 CAPSULE ORAL 3 TIMES DAILY PRN
Qty: 30 CAP | Refills: 0 | Status: SHIPPED | OUTPATIENT
Start: 2017-06-20 | End: 2017-11-30

## 2017-06-20 ASSESSMENT — ENCOUNTER SYMPTOMS
CHILLS: 0
MYALGIAS: 0
FEVER: 0
BACK PAIN: 0
DIZZINESS: 0
ORTHOPNEA: 0
WHEEZING: 0
WEAKNESS: 0
SORE THROAT: 1
HEADACHES: 0
EYE DISCHARGE: 0
EYE REDNESS: 0
COUGH: 1
PALPITATIONS: 0
SPUTUM PRODUCTION: 0
SHORTNESS OF BREATH: 0
NECK PAIN: 0

## 2017-06-20 NOTE — Clinical Note
June 20, 2017       Patient: Aruna [facility]   YOB: 1970   Date of Visit: 6/20/2017         To Whom It May Concern:    It is my medical opinion that Aruna Smith be excused from work due to illness.    If you have any questions or concerns, please don't hesitate to call [phone]          Sincerely,          FROILAN Mendez.  Electronically Signed

## 2017-06-20 NOTE — MR AVS SNAPSHOT
"        Aruna Banegasn   2017 8:25 AM   Office Visit   MRN: [id number]    Department:  [facility]   Dept Phone:  [phone]    Description:  Female : 1970   Provider:  DOMINGO Mendez           Reason for Visit     Sore Throat C/o sore throat, mostly dry cough, nasal/chest congestion x4 days.  PT states her cough his worse at night, and sore throat is more from the cough.      Allergies as of 2017     Allergen Noted Reactions    Xanax [Alprazolam Xr] 2014       shaking      You were diagnosed with     Unproductive cough   [731150]       Nasal congestion with rhinorrhea   [713095]         Vital Signs     Blood Pressure Pulse Temperature Respirations Height Weight    122/92 mmHg 78 36.2 °C (97.2 °F) 16 1.626 m (5' 4.02\") 92.08 kg (203 lb)    Body Mass Index Oxygen Saturation Smoking Status             34.83 kg/m2 96% Former Smoker         Basic Information     Date Of Birth Sex Race Ethnicity Preferred Language    1970 Female White Non- English      Your appointments     2017  3:20 PM   MA [id number] with [facility] MG 3   [facility] ( [address])    [address] [id number] [phone]           No deodorant, powder, perfume or lotion under the arm or breast area.              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 Review all of your home medications and newly ordered medications with your provider and/or pharmacist. Follow medication instructions as directed by your provider and/or pharmacist. Please keep your medication list with you and share with your provider. Update the information when medications are discontinued, doses are changed, or new medications (including over-the-counter products) are added; and carry medication information at all times in the event of emergency situations     Allergies:  XANAX - (reactions not documented)               Medications  Valid as of: June 20, 2017 -  9:12 AM    Generic Name Brand Name Tablet Size Instructions for use    Benzonatate (Cap) TESSALON 100 MG Take 1 Cap by mouth 3 times a day as needed for Cough.        BuPROPion HCl (TABLET SR 24 HR) WELLBUTRIN  MG Take 1 Tab by mouth every morning.        Cyclobenzaprine HCl (Tab) FLEXERIL 5 MG Take 1 Tab by mouth 3 times a day as needed for Moderate Pain or Muscle Spasms.        HydrOXYzine HCl (Tab) ATARAX 10 MG TAKE ONE TABLET BY MOUTH THREE TIMES A DAY AS NEEDED FOR ANXIETY   (GENERIC ATARAX)        Loratadine   Take  by mouth.        LORazepam (Tab) ATIVAN 0.5 MG Take 1 Tab by mouth every 8 hours as needed.        Montelukast Sodium (Tab) SINGULAIR 10 MG TAKE ONE TABLET BY MOUTH DAILY        Ondansetron (TABLET DISPERSIBLE) ZOFRAN ODT 4 MG Take 1 Tab by mouth every 8 hours as needed for Nausea/Vomiting.        Promethazine-DM (Syrup) PROMETHAZINE-DM 6.25-15 MG/5ML Take 5 mL by mouth 4 times a day as needed for Cough. Causes drowsiness, do not take  while working or driving        RaNITidine HCl (Tab) ZANTAC 150 MG TAKE ONE TABLET BY MOUTH TWICE A DAY        Sertraline HCl (Tab) ZOLOFT 50 MG Take 1 Tab by mouth every day.        .                 Medicines prescribed today were sent to:     [facility][id number] - [place] - [address] [place] [address]    Phone: [phone] Fax: [phone]    Open 24 Hours?: No      Medication refill instructions:       If your prescription bottle indicates you have medication refills left, it is not necessary to call your provider’s office. Please contact your pharmacy and they will refill your medication.    If your prescription bottle indicates you do not have any refills left, you may request refills at any time through one of the following ways: The online [facility] system (except Urgent Care), by calling your provider’s office, or by asking your pharmacy to contact your provider’s office with a refill request. Medication refills are processed only during regular business hours and may not be available until the next business day. Your provider may request additional information or to have a follow-up visit with you prior to refilling your medication.   *Please Note: Medication refills are assigned a new Rx number when refilled electronically. Your pharmacy may indicate that no refills were authorized even though a new prescription for the same medication is available at the pharmacy. Please request the medicine by name with the pharmacy before contacting your provider for a refill.           [facility] Access Code: Activation code not generated  Current [facility] Status: Active

## 2017-06-20 NOTE — PROGRESS NOTES
Subjective:      Aruna Smith is a 47 y.o. female who presents with Sore Throat            HPI  Aruna is a 47 year old female who is here for cough and nasal congestion x 3 days. States nasal congestion without facial pressure, has PND and sore throat without fever, intermittent nonproductive cough without SOB, chest tightness or wheeze, no h/o asthma. Taking kinza seltzer cold.    PMH:  has a past medical history of Allergic rhinitis; Anxiety; Seasonal allergies; Family history of diabetes mellitus type II; Other specified symptom associated with female genital organs; Attention deficit disorder of adult with hyperactivity; Shingles (5/2014); GERD without esophagitis; and Colitis, indeterminate.  MEDS:   Current outpatient prescriptions:   •  benzonatate (TESSALON) 100 MG Cap, Take 1 Cap by mouth 3 times a day as needed for Cough., Disp: 30 Cap, Rfl: 0  •  promethazine-dextromethorphan (PROMETHAZINE-DM) 6.25-15 MG/5ML syrup, Take 5 mL by mouth 4 times a day as needed for Cough. Causes drowsiness, do not take while working or driving, Disp: 120 mL, Rfl: 0  •  lorazepam (ATIVAN) 0.5 MG Tab, Take 1 Tab by mouth every 8 hours as needed., Disp: 60 Tab, Rfl: 2  •  cyclobenzaprine (FLEXERIL) 5 MG tablet, Take 1 Tab by mouth 3 times a day as needed for Moderate Pain or Muscle Spasms., Disp: 40 Tab, Rfl: 2  •  buPROPion (WELLBUTRIN XL) 300 MG XL tablet, Take 1 Tab by mouth every morning., Disp: 30 Tab, Rfl: 6  •  sertraline (ZOLOFT) 50 MG Tab, Take 1 Tab by mouth every day., Disp: 30 Tab, Rfl: 6  •  hydrOXYzine (ATARAX) 10 MG Tab, TAKE ONE TABLET BY MOUTH THREE TIMES A DAY AS NEEDED FOR ANXIETY   (GENERIC ATARAX), Disp: 90 Tab, Rfl: 0  •  Loratadine (CLARITIN PO), Take  by mouth., Disp: , Rfl:   •  montelukast (SINGULAIR) 10 MG Tab, TAKE ONE TABLET BY MOUTH DAILY, Disp: 30 Tab, Rfl: 6  •  ranitidine (ZANTAC) 150 MG Tab, TAKE ONE TABLET BY MOUTH TWICE A DAY, Disp: 60 Tab, Rfl: 6  •  ondansetron (ZOFRAN ODT) 4 MG TABLET  "DISPERSIBLE, Take 1 Tab by mouth every 8 hours as needed for Nausea/Vomiting., Disp: 10 Tab, Rfl: 1  ALLERGIES:   Allergies   Allergen Reactions   • Xanax [Alprazolam Xr]      shaking     SURGHX:   Past Surgical History   Procedure Laterality Date   • Vaginal hysterectomy total  10/29/2012     Performed by Michael Stewart M.D. at [facility]   • Cystoscopy  10/29/2012     [facility]     SOCHX:  reports that she quit smoking about 28 years ago. Her smoking use included Cigarettes. She has a 2 pack-year smoking history. She has never used smokeless tobacco. She reports that she drinks alcohol. She reports that she does not use illicit drugs.  FH: Family history was reviewed, no pertinent findings to report    Review of Systems   Constitutional: Negative for fever, chills and malaise/fatigue.   HENT: Positive for congestion and sore throat. Negative for ear pain.    Eyes: Negative for discharge and redness.   Respiratory: Positive for cough. Negative for sputum production, shortness of breath and wheezing.    Cardiovascular: Negative for chest pain, palpitations and orthopnea.   Musculoskeletal: Negative for myalgias, back pain and neck pain.   Neurological: Negative for dizziness, weakness and headaches.   Endo/Heme/Allergies: Negative for environmental allergies.   All other systems reviewed and are negative.         Objective:     /92 mmHg  Pulse 78  Temp(Src) 36.2 °C (97.2 °F)  Resp 16  Ht 1.626 m (5' 4.02\")  Wt 92.08 kg (203 lb)  BMI 34.83 kg/m2  SpO2 96%     Physical Exam   Constitutional: She is oriented to person, place, and time. Vital signs are normal. She appears well-developed and well-nourished. She is active and cooperative.  Non-toxic appearance. She does not have a sickly appearance. She does not appear ill. No distress.   HENT:   Head: Normocephalic.   Right Ear: External ear and ear canal normal. Tympanic " membrane is bulging. A middle ear effusion is present.   Left Ear: External ear and ear canal normal. Tympanic membrane is bulging. A middle ear effusion is present.   Nose: Mucosal edema and rhinorrhea present. No sinus tenderness.   Mouth/Throat: Uvula is midline and oropharynx is clear and moist. Mucous membranes are dry. No uvula swelling.   Eyes: Conjunctivae and EOM are normal. Pupils are equal, round, and reactive to light.   Neck: Normal range of motion. Neck supple.   Cardiovascular: Normal rate and regular rhythm.    Pulmonary/Chest: Effort normal. No accessory muscle usage. No respiratory distress. She has no decreased breath sounds. She has no wheezes. She has no rhonchi. She has no rales.   Musculoskeletal: Normal range of motion.   Lymphadenopathy:     She has no cervical adenopathy.   Neurological: She is alert and oriented to person, place, and time.   Skin: Skin is warm and dry. She is not diaphoretic.   Vitals reviewed.              Assessment/Plan:     1. Unproductive cough    - benzonatate (TESSALON) 100 MG Cap; Take 1 Cap by mouth 3 times a day as needed for Cough.  Dispense: 30 Cap; Refill: 0  - promethazine-dextromethorphan (PROMETHAZINE-DM) 6.25-15 MG/5ML syrup; Take 5 mL by mouth 4 times a day as needed for Cough. Causes drowsiness, do not take while working or driving  Dispense: 120 mL; Refill: 0    2. Nasal congestion with rhinorrhea    Increase water intake  May use Ibuprofen/Tylenol prn for fever or body aches  Get rest  May use daily longer acting antihistamine for seasonal allergy symptoms  May use saline nasal spray/flonase prn to flush any nasal congestion  Monitor for fevers, productive cough, SOB, CP, chest tightness, increased sinus pressure with nasal d/c- need re-evaluation

## 2017-06-26 ENCOUNTER — HOSPITAL ENCOUNTER (OUTPATIENT)
Dept: RADIOLOGY | Facility: MEDICAL CENTER | Age: 47
End: 2017-06-26
Attending: FAMILY MEDICINE
Payer: COMMERCIAL

## 2017-06-26 DIAGNOSIS — Z12.39 SCREENING BREAST EXAMINATION: ICD-10-CM

## 2017-06-26 PROCEDURE — 77063 BREAST TOMOSYNTHESIS BI: CPT

## 2017-08-11 RX ORDER — RANITIDINE 150 MG/1
TABLET ORAL
Qty: 60 TAB | Refills: 6 | Status: SHIPPED | OUTPATIENT
Start: 2017-08-11 | End: 2018-04-11 | Stop reason: SDUPTHER

## 2017-08-11 RX ORDER — CYCLOBENZAPRINE HCL 5 MG
TABLET ORAL
Qty: 40 TAB | Refills: 0 | Status: SHIPPED
Start: 2017-08-11 | End: 2017-10-05 | Stop reason: SDUPTHER

## 2017-09-09 DIAGNOSIS — J30.1 SEASONAL ALLERGIC RHINITIS DUE TO POLLEN: ICD-10-CM

## 2017-09-10 RX ORDER — MONTELUKAST SODIUM 10 MG/1
TABLET ORAL
Qty: 30 TAB | Refills: 5 | Status: SHIPPED | OUTPATIENT
Start: 2017-09-10 | End: 2018-04-11 | Stop reason: SDUPTHER

## 2017-09-22 ENCOUNTER — TELEMEDICINE2 (OUTPATIENT)
Dept: URGENT CARE | Facility: CLINIC | Age: 47
End: 2017-09-22
Payer: COMMERCIAL

## 2017-09-22 DIAGNOSIS — H92.02 LEFT EAR PAIN: ICD-10-CM

## 2017-09-22 DIAGNOSIS — R09.81 SINUS CONGESTION: ICD-10-CM

## 2017-09-22 PROCEDURE — 99213 OFFICE O/P EST LOW 20 MIN: CPT | Mod: GT | Performed by: PHYSICIAN ASSISTANT

## 2017-09-22 ASSESSMENT — ENCOUNTER SYMPTOMS
VOMITING: 0
SHORTNESS OF BREATH: 0
DIZZINESS: 0
NAUSEA: 0
COUGH: 0
SPUTUM PRODUCTION: 0
FEVER: 0
CHILLS: 0
HEADACHES: 0
MUSCULOSKELETAL NEGATIVE: 1
ABDOMINAL PAIN: 0
DIARRHEA: 0
SORE THROAT: 0

## 2017-09-22 NOTE — LETTER
September 22, 2017         Patient: Aruna Smith   YOB: 1970   Date of Visit: 9/22/2017           To Whom it May Concern:    Aruna Smith was seen in my clinic on 9/22/2017. Please excuse her absence from 9/21/17-9/22/17.    If you have any questions or concerns, please don't hesitate to call.        Sincerely,           Angela Mckeon P.A.-C.  Electronically Signed

## 2017-09-22 NOTE — PROGRESS NOTES
Subjective:      Aruna Smith is a 47 y.o. female who presents with Sinus congestion.       Encounter was completed using a secure and encrypted videoconferencing equipment, the patient gave verbal consent and patient identification was confirmed.      HPI  Patient reports a 1 week history of sinus pressure and congestion that is mostly affecting her left maxillary sinus. She also reports left ear pain x 2 days that has been intermittent. She reports the ear pain is improved today. She reports a history of infrequent sinus infections. No history of sinus surgeries or problems with chronic or frequent ear infections. She denies any fevers, chills, body aches, cough, chest pain, SOB, nausea, or vomiting.     Review of Systems   Constitutional: Negative for chills and fever.   HENT: Positive for congestion and ear pain. Negative for sore throat.         Positive for sinus pressure   Respiratory: Negative for cough, sputum production and shortness of breath.    Cardiovascular: Negative for chest pain.   Gastrointestinal: Negative for abdominal pain, diarrhea, nausea and vomiting.   Genitourinary: Negative.    Musculoskeletal: Negative.    Neurological: Negative for dizziness and headaches.          Objective:     There were no vitals taken for this visit.     Physical Exam   Constitutional: She appears well-developed. No distress.   HENT:   Head: Normocephalic and atraumatic.   Eyes: Conjunctivae are normal.   Pulmonary/Chest: Effort normal.   Neurological: She is alert.          PMH:  has a past medical history of Allergic rhinitis; Anxiety; Attention deficit disorder of adult with hyperactivity; Colitis, indeterminate; Family history of diabetes mellitus type II; GERD without esophagitis; Other specified symptom associated with female genital organs; Seasonal allergies; and Shingles (5/2014).  MEDS:   Current Outpatient Prescriptions:   •  montelukast (SINGULAIR) 10 MG Tab, TAKE ONE TABLET BY MOUTH DAILY, Disp: 30  Tab, Rfl: 5  •  cyclobenzaprine (FLEXERIL) 5 MG tablet, TAKE ONE TABLET BY MOUTH THREE TIMES A DAY AS NEEDED FOR MODERATE PAIN OR MUSCLE SPASMS (GENERIC FOR FLEXERIL), Disp: 40 Tab, Rfl: 0  •  ranitidine (ZANTAC) 150 MG Tab, TAKE ONE TABLET BY MOUTH TWICE A DAY, Disp: 60 Tab, Rfl: 6  •  benzonatate (TESSALON) 100 MG Cap, Take 1 Cap by mouth 3 times a day as needed for Cough., Disp: 30 Cap, Rfl: 0  •  promethazine-dextromethorphan (PROMETHAZINE-DM) 6.25-15 MG/5ML syrup, Take 5 mL by mouth 4 times a day as needed for Cough. Causes drowsiness, do not take while working or driving, Disp: 120 mL, Rfl: 0  •  lorazepam (ATIVAN) 0.5 MG Tab, Take 1 Tab by mouth every 8 hours as needed., Disp: 60 Tab, Rfl: 2  •  ondansetron (ZOFRAN ODT) 4 MG TABLET DISPERSIBLE, Take 1 Tab by mouth every 8 hours as needed for Nausea/Vomiting., Disp: 10 Tab, Rfl: 1  •  buPROPion (WELLBUTRIN XL) 300 MG XL tablet, Take 1 Tab by mouth every morning., Disp: 30 Tab, Rfl: 6  •  sertraline (ZOLOFT) 50 MG Tab, Take 1 Tab by mouth every day., Disp: 30 Tab, Rfl: 6  •  hydrOXYzine (ATARAX) 10 MG Tab, TAKE ONE TABLET BY MOUTH THREE TIMES A DAY AS NEEDED FOR ANXIETY   (GENERIC ATARAX), Disp: 90 Tab, Rfl: 0  •  Loratadine (CLARITIN PO), Take  by mouth., Disp: , Rfl:   ALLERGIES:   Allergies   Allergen Reactions   • Xanax [Alprazolam Xr]      shaking     SURGHX:   Past Surgical History:   Procedure Laterality Date   • VAGINAL HYSTERECTOMY TOTAL  10/29/2012    Performed by Michael Stewart M.D. at SURGERY SAME DAY [facility] ORS   • CYSTOSCOPY  10/29/2012    Performed by Michael Stewart M.D. at SURGERY SAME DAY [facility] ORS     SOCHX:  reports that she quit smoking about 29 years ago. Her smoking use included Cigarettes. She has a 2.00 pack-year smoking history. She has never used smokeless tobacco. She reports that she drinks alcohol. She reports that she does not use drugs.  FH: family history includes Diabetes in her mother; Heart Disease in her father;  Hypertension in her brother, maternal grandfather, and mother.       Assessment/Plan:     1. Sinus congestion    2. Left ear pain    Advised patient her symptoms are most likely viral in etiology, recommend supportive care. Increased fluids and rest. Discussed use of nedi-pot, humidifier, and Flonase nasal spray for symptomatic relief. Patient's ear pain is not worsening, advised it is most likely due to sinus congestion and pressure, although I encouraged patient to seek medical attention in person for evaluation of possible left ear infection if pain does not resolve or worsens. Work note given. The patient demonstrated a good understanding and agreed with the treatment plan.

## 2017-11-18 DIAGNOSIS — F90.9 ATTENTION DEFICIT DISORDER OF ADULT WITH HYPERACTIVITY: ICD-10-CM

## 2017-11-20 RX ORDER — BUPROPION HYDROCHLORIDE 300 MG/1
TABLET ORAL
Qty: 30 TAB | Refills: 5 | Status: SHIPPED | OUTPATIENT
Start: 2017-11-20 | End: 2018-05-26 | Stop reason: SDUPTHER

## 2017-11-29 RX ORDER — CYCLOBENZAPRINE HCL 5 MG
TABLET ORAL
Qty: 40 TAB | Refills: 0 | Status: SHIPPED
Start: 2017-11-29 | End: 2017-11-30 | Stop reason: SDUPTHER

## 2017-11-30 ENCOUNTER — OFFICE VISIT (OUTPATIENT)
Dept: MEDICAL GROUP | Facility: CLINIC | Age: 47
End: 2017-11-30
Payer: COMMERCIAL

## 2017-11-30 VITALS
DIASTOLIC BLOOD PRESSURE: 58 MMHG | WEIGHT: 208 LBS | BODY MASS INDEX: 35.51 KG/M2 | TEMPERATURE: 97.9 F | SYSTOLIC BLOOD PRESSURE: 116 MMHG | OXYGEN SATURATION: 98 % | HEART RATE: 87 BPM | HEIGHT: 64 IN

## 2017-11-30 DIAGNOSIS — M62.830 SPASM OF BACK MUSCLES: ICD-10-CM

## 2017-11-30 DIAGNOSIS — Z83.3 FAMILY HISTORY OF DIABETES MELLITUS TYPE II: ICD-10-CM

## 2017-11-30 DIAGNOSIS — E78.2 HYPERCHOLESTEROLEMIA WITH HYPERTRIGLYCERIDEMIA: ICD-10-CM

## 2017-11-30 DIAGNOSIS — Z23 NEED FOR VACCINATION: ICD-10-CM

## 2017-11-30 DIAGNOSIS — K21.9 GERD WITHOUT ESOPHAGITIS: ICD-10-CM

## 2017-11-30 DIAGNOSIS — R80.9 URINE PROTEIN INCREASED: ICD-10-CM

## 2017-11-30 DIAGNOSIS — F90.9 ATTENTION DEFICIT DISORDER OF ADULT WITH HYPERACTIVITY: ICD-10-CM

## 2017-11-30 DIAGNOSIS — E66.9 OBESITY, CLASS II, BMI 35-39.9: ICD-10-CM

## 2017-11-30 PROCEDURE — 99214 OFFICE O/P EST MOD 30 MIN: CPT | Mod: 25 | Performed by: FAMILY MEDICINE

## 2017-11-30 PROCEDURE — 90686 IIV4 VACC NO PRSV 0.5 ML IM: CPT | Performed by: FAMILY MEDICINE

## 2017-11-30 PROCEDURE — 90471 IMMUNIZATION ADMIN: CPT | Performed by: FAMILY MEDICINE

## 2017-11-30 RX ORDER — CYCLOBENZAPRINE HCL 5 MG
5 TABLET ORAL 3 TIMES DAILY PRN
Qty: 40 TAB | Refills: 2 | Status: SHIPPED | OUTPATIENT
Start: 2017-11-30 | End: 2018-04-11 | Stop reason: SDUPTHER

## 2017-11-30 NOTE — PROGRESS NOTES
Chief Complaint   Patient presents with   • Gastrophageal Reflux   • Hyperlipidemia   • Back Pain   • ADHD       Subjective:     HPI:   Aruna Smith presents today with the followin. GERD without esophagitis  She feels the current medication regimen of ranitidine is controlling the gastroesophageal reflux symptoms well. Denies dysphagia, reflux symptoms, acidity, abdominal pain or visible blood or mucus in the stool. Denies vomiting or hematemesis. Denies burping or abdominal bloating. Patient avoids nonsteroidal anti-inflammatory drugs. Avoids heavy meals or eating within 2 hours of bedtime.  Lab orders are discussed and placed.    2. Hypercholesterolemia with hypertriglyceridemia  Patient denies chest pain, chest pressure, palpitations or exertional shortness of breath. Patient is not on lipid-lowering medication. Lipid testing last year revealed moderate elevation, and rarely high triglycerides with a reasonable risk ratio. Patient is a former smoker. Patient takes no aspirin daily due to past GI issues. Patient has no history of myocardial infarction, stroke or PVD.  The problem is clinically stable. Lab orders are discussed and placed    3. Attention deficit disorder of adult with hyperactivity  Patient feels the combination of bupropion and sertraline is giving her much better focus overall. She states this works much better for her than the amphetamine did. Denies any palpitations or increased difficulty sleeping. She feels she has had mild weight gain of the amphetamine but that this might of been true for her anyway. She is satisfied with the regimen.    4. Urine protein increased  She did have increased urine protein. Discussed the importance of following this up and drinking enough fluid. Blood pressure is in extremely good control. Lab orders are discussed and placed.    5. Spasm of back muscles  She requests a renewal of the cyclobenzaprine. Denies excessive somnolence or dry mouth from the  medication.  She is primarily using of this at nighttime though does occasionally use this for rescue during the day. She is aware that driving on this medication is not recommended as it can effect reaction time.    6. Family history of diabetes mellitus type II  She has a strong family history. Mother was poorly controlled. Lab orders are discussed and placed.    7. Obesity, Class II, BMI 35-39.9 (CMS-HCC)  Discussed weight with now class II obesity. Her mother had a very serious problem with overweight which affected her overall health. She is contemplating a change in her regimen and is also contemplating a change in diet. Discussed various diet strategies and trying to keep attempting lower quality foods out of the house. Discussed regular moderate exercise. She is changing some of her daytime regimen to accommodate this.    8. Need for vaccination  Administered today          Patient Active Problem List    Diagnosis Date Noted   • Hypercholesterolemia with hypertriglyceridemia    • GERD without esophagitis    • Colitis, indeterminate    • Obesity, Class II, BMI 35-39.9 (CMS-HCC) 07/30/2014   • Attention deficit disorder of adult with hyperactivity    • Family history of diabetes mellitus type II    • Seasonal allergic rhinitis    • Seasonal allergies    • Anxiety        Current medicines (including changes today)  Current Outpatient Prescriptions   Medication Sig Dispense Refill   • cyclobenzaprine (FLEXERIL) 5 MG tablet Take 1 Tab by mouth 3 times a day as needed for Moderate Pain or Muscle Spasms. 40 Tab 2   • sertraline (ZOLOFT) 50 MG Tab Take 1 Tab by mouth every day. 30 Tab 6   • buPROPion (WELLBUTRIN XL) 300 MG XL tablet TAKE ONE TABLET BY MOUTH EVERY MORNING (GENERIC FOR WELLBUTRIN XL) 30 Tab 5   • montelukast (SINGULAIR) 10 MG Tab TAKE ONE TABLET BY MOUTH DAILY 30 Tab 5   • ranitidine (ZANTAC) 150 MG Tab TAKE ONE TABLET BY MOUTH TWICE A DAY 60 Tab 6   • lorazepam (ATIVAN) 0.5 MG Tab Take 1 Tab by mouth  "every 8 hours as needed. 60 Tab 2   • hydrOXYzine (ATARAX) 10 MG Tab TAKE ONE TABLET BY MOUTH THREE TIMES A DAY AS NEEDED FOR ANXIETY   (GENERIC ATARAX) 90 Tab 0   • Loratadine (CLARITIN PO) Take  by mouth.       No current facility-administered medications for this visit.        Allergies   Allergen Reactions   • Xanax [Alprazolam Xr]      shaking       ROS: As per HPI       Objective:     Blood pressure 116/58, pulse 87, temperature 36.6 °C (97.9 °F), height 1.626 m (5' 4\"), weight 94.3 kg (208 lb), SpO2 98 %, not currently breastfeeding. Body mass index is 35.7 kg/m².    Physical Exam:  Constitutional: Well-developed and well-nourished. Not diaphoretic. No distress. Lucid and fluent.  Skin: Skin is warm and dry. No rash noted.  Head: Atraumatic without lesions.  Eyes: Conjunctivae and extraocular motions are normal. Pupils are equal, round, and reactive to light. No scleral icterus.   Ears:  External ears unremarkable. Tympanic membranes clear and intact.  Nose: Nares patent. Mucosa without edema or erythema. No discharge. No facial tenderness.  Mouth/Throat: Tongue normal. Oropharynx is clear and moist. Posterior pharynx without erythema or exudates.  Neck: Supple, trachea midline. No thyromegaly present. No cervical or supraclavicular lymphadenopathy. No JVD or carotid bruits appreciated  Cardiovascular: Regular rate and rhythm.  Normal S1, S2 without murmur appreciated.  Chest: Effort normal. Clear to auscultation throughout. No adventitious sounds.   Abdomen: Soft, non tender, and without distention. Active bowel sounds in all four quadrants. No rebound, guarding, masses or hepatosplenomegaly.  Extremities: No cyanosis, clubbing, erythema, nor edema.   Neurological: Alert and oriented x 3. DTRs 2+/3 and symmetric. No cranial nerve deficit.  Psychiatric:  Behavior, mood, and affect are appropriate.       Assessment and Plan:     47 y.o. female with the following issues:    1. GERD without esophagitis  COMP " METABOLIC PANEL    CBC WITHOUT DIFFERENTIAL   2. Hypercholesterolemia with hypertriglyceridemia  COMP METABOLIC PANEL    LIPID PROFILE    TSH   3. Attention deficit disorder of adult with hyperactivity  sertraline (ZOLOFT) 50 MG Tab    TSH   4. Urine protein increased  URINALYSIS,CULTURE IF INDICATED   5. Spasm of back muscles  cyclobenzaprine (FLEXERIL) 5 MG tablet   6. Family history of diabetes mellitus type II  COMP METABOLIC PANEL   7. Obesity, Class II, BMI 35-39.9 (CMS-[facility])  Patient identified as having weight management issue.  Appropriate orders and counseling given.   8. Need for vaccination  INFLUENZA VACCINE QUAD INJ >3Y(PF)         Followup: No Follow-up on file.

## 2018-01-19 DIAGNOSIS — F41.9 ANXIETY: ICD-10-CM

## 2018-01-19 RX ORDER — LORAZEPAM 0.5 MG/1
0.5 TABLET ORAL EVERY 8 HOURS PRN
Qty: 60 TAB | Refills: 2 | Status: SHIPPED
Start: 2018-01-19 | End: 2018-05-15 | Stop reason: SDUPTHER

## 2018-03-05 ENCOUNTER — TELEPHONE (OUTPATIENT)
Dept: URGENT CARE | Facility: PHYSICIAN GROUP | Age: 48
End: 2018-03-05

## 2018-03-05 ENCOUNTER — APPOINTMENT (OUTPATIENT)
Dept: URGENT CARE | Facility: PHYSICIAN GROUP | Age: 48
End: 2018-03-05
Payer: COMMERCIAL

## 2018-03-06 ENCOUNTER — TELEMEDICINE2 (OUTPATIENT)
Dept: URGENT CARE | Facility: PHYSICIAN GROUP | Age: 48
End: 2018-03-06
Payer: COMMERCIAL

## 2018-03-06 DIAGNOSIS — K52.9 ACUTE GASTROENTERITIS: ICD-10-CM

## 2018-03-06 PROCEDURE — 99213 OFFICE O/P EST LOW 20 MIN: CPT | Performed by: FAMILY MEDICINE

## 2018-03-06 NOTE — PROGRESS NOTES
Chief Complaint:    Chief Complaint   Patient presents with   • Diarrhea     has been out of work 1 week   • Fever   • Fatigue       History of Present Illness:    This is a new problem. For the past week she has had GI symptoms. Had nausea, but this has resolved. Had diarrhea, at worst, was too many episodes to count in a day. Yesterday had 3 episodes of diarrhea. Today no more diarrhea. She feels everything is improving, nothing is getting worse. She mainly needs a note for work to excuse her days missed and to be allowed to return tomorrow.      Review of Systems:    Constitutional: Negative for fever, chills, and diaphoresis.   Eyes: Negative for change in vision, photophobia, pain, redness, and discharge.  ENT: Negative for ear pain, ear discharge, hearing loss, tinnitus, nasal congestion, nosebleeds, and sore throat.    Respiratory: Negative for cough, hemoptysis, sputum production, shortness of breath, wheezing, and stridor.    Cardiovascular: Negative for chest pain, palpitations, orthopnea, claudication, leg swelling, and PND.   Gastrointestinal:  See HPI.  Genitourinary: Negative for dysuria, urinary urgency, urinary frequency, hematuria, and flank pain.   Musculoskeletal: Negative for myalgias, joint pain, neck pain, and back pain.   Skin: Negative for rash and itching.   Neurological: Negative for dizziness, tingling, tremors, sensory change, speech change, focal weakness, seizures, loss of consciousness, and headaches.   Endo: Negative for polydipsia.   Heme: Does not bruise/bleed easily.   Psychiatric/Behavioral: No new symptoms.       Past Medical History:    Past Medical History:   Diagnosis Date   • Allergic rhinitis    • Anxiety    • Attention deficit disorder of adult with hyperactivity    • Colitis, indeterminate    • Family history of diabetes mellitus type II    • GERD without esophagitis    • Hypercholesterolemia with hypertriglyceridemia    • Other specified symptom associated with female  genital organs    • Seasonal allergies    • Shingles 5/2014     Past Surgical History:    Past Surgical History:   Procedure Laterality Date   • VAGINAL HYSTERECTOMY TOTAL  10/29/2012    Performed by Michael Stewart M.D. at SURGERY SAME DAY [facility]   • CYSTOSCOPY  10/29/2012    Performed by Michael Stewart M.D. at SURGERY SAME DAY [facility]     Social History:    Social History     Social History   • Marital status:      Spouse name: N/A   • Number of children: N/A   • Years of education: N/A     Occupational History   • Not on file.     Social History Main Topics   • Smoking status: Former Smoker     Packs/day: 0.50     Years: 4.00     Types: Cigarettes     Quit date: 9/1/1988   • Smokeless tobacco: Never Used   • Alcohol use No      Comment: very rare, around twice a year   • Drug use: No   • Sexual activity: Yes     Partners: Male     Other Topics Concern   • Not on file     Social History Narrative   • No narrative on file     Family History:    Family History   Problem Relation Age of Onset   • Heart Disease Father      mitral valve replacement, rheumatic fever/congestive heart failure   • Diabetes Mother    • Hypertension Mother    • Hypertension Brother    • Hypertension Maternal Grandfather      Medications:    Current Outpatient Prescriptions on File Prior to Visit   Medication Sig Dispense Refill   • lorazepam (ATIVAN) 0.5 MG Tab Take 1 Tab by mouth every 8 hours as needed for up to 90 days. 60 Tab 2   • cyclobenzaprine (FLEXERIL) 5 MG tablet Take 1 Tab by mouth 3 times a day as needed for Moderate Pain or Muscle Spasms. 40 Tab 2   • sertraline (ZOLOFT) 50 MG Tab Take 1 Tab by mouth every day. 30 Tab 6   • buPROPion (WELLBUTRIN XL) 300 MG XL tablet TAKE ONE TABLET BY MOUTH EVERY MORNING (GENERIC FOR WELLBUTRIN XL) 30 Tab 5   • montelukast (SINGULAIR) 10 MG Tab TAKE ONE TABLET BY MOUTH DAILY 30 Tab 5   • ranitidine (ZANTAC) 150 MG Tab TAKE ONE TABLET BY MOUTH TWICE A DAY 60 Tab 6   •  hydrOXYzine (ATARAX) 10 MG Tab TAKE ONE TABLET BY MOUTH THREE TIMES A DAY AS NEEDED FOR ANXIETY   (GENERIC ATARAX) 90 Tab 0   • Loratadine (CLARITIN PO) Take  by mouth.       No current facility-administered medications on file prior to visit.      Allergies:    Allergies   Allergen Reactions   • Xanax [Alprazolam Xr]      shaking       Vitals:    There were no vitals filed for this visit.      Physical Exam:    Constitutional: Appears well-developed and well-nourished. No acute distress.   Eyes: Sclera white, conjunctivae clear.   ENT: Head is grossly normal. External ears grossly normal. Hearing grossly normal. Nose grossly normal.   Neck: Neck supple.   Pulmonary/Chest: Respirations non-labored. Speaks in full sentences.  Neurological: Alert and oriented to person, place, and time.   Psychiatric: Normal mood and affect. Behavior is normal. Judgment and thought content normal.       Assessment / Plan:    1. Acute gastroenteritis      Discussed with her DDX and management options.    Everything is getting better, nothing is getting worse.    Work note sent via [employer]: Aruna Smith was seen in via Telemed on 3/6/2018. Please excuse from work for 2/28, 3/5, and 3/6/18 due to medical condition. May return to regular full duty on 3/7/18.    Advised she may also go to any [facility] location to have note printed if needed.    Encounter was completed using secure and encrypted videoconferencing equipment, the patient gave consent, and patient identification was confirmed.

## 2018-03-06 NOTE — TELEPHONE ENCOUNTER
I connected into the appointment six minuets late an she was not there.  After 10 minuets I disconnected the call.  She was called back an rescheduled for 5:15 pm  I connected into the appointment at 5:00 an she was still not there.  She was on the phone having problems connecting, the  instructed her to call IT I tried to talk to her but she had hung up. I connected into her visit again at 5:18p.m an she was still not there.

## 2018-03-06 NOTE — LETTER
March 6, 2018         Patient: Aruna Smith   YOB: 1970   Date of Visit: 3/6/2018           To Whom it May Concern:    Aruna Smith was seen in via Telemed on 3/6/2018.    Please excuse from work for 2/28, 3/5, and 3/6/18 due to medical condition.    May return to regular full duty on 3/7/18.    If you have any questions or concerns, please don't hesitate to call.        Sincerely,           Gopi Bennett M.D.  Electronically Signed

## 2018-04-11 DIAGNOSIS — J30.1 SEASONAL ALLERGIC RHINITIS DUE TO POLLEN: ICD-10-CM

## 2018-04-11 DIAGNOSIS — M62.830 SPASM OF BACK MUSCLES: ICD-10-CM

## 2018-04-11 RX ORDER — RANITIDINE 150 MG/1
TABLET ORAL
Qty: 60 TAB | Refills: 3 | Status: SHIPPED | OUTPATIENT
Start: 2018-04-11 | End: 2018-09-22 | Stop reason: SDUPTHER

## 2018-04-11 RX ORDER — CYCLOBENZAPRINE HCL 5 MG
TABLET ORAL
Qty: 40 TAB | Refills: 0 | Status: SHIPPED
Start: 2018-04-11 | End: 2018-04-26 | Stop reason: SDUPTHER

## 2018-04-11 RX ORDER — MONTELUKAST SODIUM 10 MG/1
TABLET ORAL
Qty: 30 TAB | Refills: 3 | Status: SHIPPED | OUTPATIENT
Start: 2018-04-11 | End: 2018-12-27 | Stop reason: SDUPTHER

## 2018-04-26 DIAGNOSIS — M62.830 SPASM OF BACK MUSCLES: ICD-10-CM

## 2018-04-26 RX ORDER — CYCLOBENZAPRINE HCL 5 MG
5 TABLET ORAL 3 TIMES DAILY PRN
Qty: 40 TAB | Refills: 0 | Status: SHIPPED
Start: 2018-04-26 | End: 2018-05-26 | Stop reason: SDUPTHER

## 2018-05-15 DIAGNOSIS — F41.9 ANXIETY: ICD-10-CM

## 2018-05-15 RX ORDER — LORAZEPAM 0.5 MG/1
0.5 TABLET ORAL EVERY 8 HOURS PRN
Qty: 60 TAB | Refills: 0 | Status: SHIPPED
Start: 2018-05-15 | End: 2018-07-26 | Stop reason: SDUPTHER

## 2018-07-16 DIAGNOSIS — M62.830 SPASM OF BACK MUSCLES: ICD-10-CM

## 2018-07-16 RX ORDER — CYCLOBENZAPRINE HCL 5 MG
TABLET ORAL
Qty: 40 TAB | Refills: 0 | OUTPATIENT
Start: 2018-07-16

## 2018-07-16 RX ORDER — LORAZEPAM 0.5 MG/1
TABLET ORAL
Qty: 60 TAB | Refills: 0
Start: 2018-07-16 | End: 2018-08-15

## 2018-07-17 NOTE — TELEPHONE ENCOUNTER
Unfortunately patient is requesting controlled substances.  Has been greater than 6 months since she was last seen and needs visit before these can be rewritten.

## 2018-07-26 ENCOUNTER — OFFICE VISIT (OUTPATIENT)
Dept: MEDICAL GROUP | Facility: CLINIC | Age: 48
End: 2018-07-26
Payer: COMMERCIAL

## 2018-07-26 VITALS
WEIGHT: 212 LBS | HEART RATE: 65 BPM | OXYGEN SATURATION: 93 % | BODY MASS INDEX: 36.19 KG/M2 | SYSTOLIC BLOOD PRESSURE: 140 MMHG | HEIGHT: 64 IN | DIASTOLIC BLOOD PRESSURE: 92 MMHG | TEMPERATURE: 97.9 F | RESPIRATION RATE: 14 BRPM

## 2018-07-26 DIAGNOSIS — F41.9 CHRONIC ANXIETY: ICD-10-CM

## 2018-07-26 DIAGNOSIS — E78.2 HYPERCHOLESTEROLEMIA WITH HYPERTRIGLYCERIDEMIA: ICD-10-CM

## 2018-07-26 DIAGNOSIS — M62.830 SPASM OF BACK MUSCLES: ICD-10-CM

## 2018-07-26 DIAGNOSIS — K21.9 GERD WITHOUT ESOPHAGITIS: ICD-10-CM

## 2018-07-26 DIAGNOSIS — F90.9 ATTENTION DEFICIT DISORDER OF ADULT WITH HYPERACTIVITY: ICD-10-CM

## 2018-07-26 DIAGNOSIS — R80.9 URINE PROTEIN INCREASED: ICD-10-CM

## 2018-07-26 PROCEDURE — 99213 OFFICE O/P EST LOW 20 MIN: CPT | Performed by: FAMILY MEDICINE

## 2018-07-26 RX ORDER — CYCLOBENZAPRINE HCL 5 MG
5 TABLET ORAL 3 TIMES DAILY PRN
Qty: 60 TAB | Refills: 0 | Status: SHIPPED | OUTPATIENT
Start: 2018-07-26 | End: 2018-08-23 | Stop reason: SDUPTHER

## 2018-07-26 RX ORDER — LORAZEPAM 0.5 MG/1
0.5 TABLET ORAL EVERY 8 HOURS PRN
Qty: 60 TAB | Refills: 0 | Status: SHIPPED | OUTPATIENT
Start: 2018-07-26 | End: 2018-08-23 | Stop reason: SDUPTHER

## 2018-07-26 ASSESSMENT — PATIENT HEALTH QUESTIONNAIRE - PHQ9: CLINICAL INTERPRETATION OF PHQ2 SCORE: 0

## 2018-07-26 NOTE — PROGRESS NOTES
Chief Complaint   Patient presents with   • Spasms   • Anxiety   • ADHD       Subjective:     HPI:   Aruna Smith presents today with the followin. Spasm of back muscles  Back muscle spasm continues to be problematic.  This is episodic and she finds that the cyclobenzaprine is very helpful.  She ends up having to use this an average of 4 times a month for about 3 days at a time.  She has made the last prescription last over 2 months.  [facility] board of pharmacy interface review shows no inconsistencies.    2. Chronic anxiety  Patient continues to find the Lorazepam to be quite helpful.  Again, she is using this only as needed and finds it has good relief.  She denies rebound anxiety, tremor, confusion or depression from the medication.    3. Attention deficit disorder of adult with hyperactivity  Patient states the bupropion continues to be very helpful, allowing her to concentrate adequately at work the best majority of the time.  She still has to occasionally take time off because of this problem but she feels overall it is well controlled.  Denies insomnia, tremor or nausea from the medication.    4. GERD without esophagitis  She feels the current medication regimen of ranitidine is controlling the gastroesophageal reflux symptoms well. Denies dysphagia, reflux symptoms, acidity, abdominal pain or visible blood or mucus in the stool. Denies vomiting or hematemesis. Denies burping or abdominal bloating. Patient avoids nonsteroidal anti-inflammatory drugs. Avoids heavy meals or eating within 2 hours of bedtime.  Lab orders discussed and placed.    5. Hypercholesterolemia with hypertriglyceridemia  She is not on lipid-lowering medication.  Lipids have been mildly elevated in the past with primarily moderate triglyceride elevation.  Lab orders are discussed and placed.    6. Urine protein increased  She had some moderate urine protein elevation in the past.  This was most likely related to UTI but that is  "not certain.  Urinalysis now that she is asymptomatic is discussed and order placed.        Patient Active Problem List    Diagnosis Date Noted   • Hypercholesterolemia with hypertriglyceridemia    • GERD without esophagitis    • Colitis, indeterminate    • Obesity, Class II, BMI 35-39.9 (CMS-[facility]) 07/30/2014   • Attention deficit disorder of adult with hyperactivity    • Family history of diabetes mellitus type II    • Seasonal allergic rhinitis    • Seasonal allergies    • Anxiety        Current medicines (including changes today)  Current Outpatient Prescriptions   Medication Sig Dispense Refill   • cyclobenzaprine (FLEXERIL) 5 MG tablet Take 1 Tab by mouth 3 times a day as needed for Muscle Spasms. 60 Tab 0   • LORazepam (ATIVAN) 0.5 MG Tab Take 1 Tab by mouth every 8 hours as needed for up to 30 days. 60 Tab 0   • sertraline (ZOLOFT) 50 MG Tab Take 1 Tab by mouth every day. 30 Tab 6   • buPROPion (WELLBUTRIN XL) 300 MG XL tablet TAKE ONE TABLET BY MOUTH EVERY MORNING  GENERIC FOR WELLBUTRIN XL 30 Tab 4   • montelukast (SINGULAIR) 10 MG Tab TAKE ONE TABLET BY MOUTH DAILY 30 Tab 3   • raNITidine (ZANTAC) 150 MG Tab TAKE ONE TABLET BY MOUTH TWICE A DAY 60 Tab 3   • hydrOXYzine (ATARAX) 10 MG Tab TAKE ONE TABLET BY MOUTH THREE TIMES A DAY AS NEEDED FOR ANXIETY   (GENERIC ATARAX) 90 Tab 0   • Loratadine (CLARITIN PO) Take  by mouth.       No current facility-administered medications for this visit.        Allergies   Allergen Reactions   • Xanax [Alprazolam Xr]      shaking       ROS: As per HPI       Objective:     Blood pressure 140/92, pulse 65, temperature 36.6 °C (97.9 °F), resp. rate 14, height 1.626 m (5' 4\"), weight 96.2 kg (212 lb), SpO2 93 %. Body mass index is 36.39 kg/m².    Physical Exam:  Constitutional: Well-developed and well-nourished. Not diaphoretic. No distress. Lucid and fluent.  Skin: Skin is warm and dry. No rash noted.  Head: Atraumatic without lesions.  Eyes: Conjunctivae and extraocular " motions are normal. Pupils are equal, round, and reactive to light. No scleral icterus.   Mouth/Throat: Tongue normal. Oropharynx is clear and moist. Posterior pharynx without erythema or exudates.  Neck: Supple, trachea midline. No thyromegaly present. No cervical or supraclavicular lymphadenopathy. No JVD or carotid bruits appreciated  Cardiovascular: Regular rate and rhythm.  Normal S1, S2 without murmur appreciated.  Chest: Effort normal. Clear to auscultation throughout. No adventitious sounds.   Abdomen: Soft, non tender, and without distention. Active bowel sounds in all four quadrants. No rebound, guarding, masses or hepatosplenomegaly.  Extremities: No cyanosis, clubbing, erythema, nor edema.  Back muscles continue to show moderate to severe spasm in the paravertebral muscles, primarily upper lumbar and lower thoracic, more prominent on the right.  Neurological: Alert and oriented x 3.  Movements are symmetric and strong.  Psychiatric:  Behavior, mood, and affect are appropriate.       Assessment and Plan:     48 y.o. female with the following issues:    1. Spasm of back muscles  cyclobenzaprine (FLEXERIL) 5 MG tablet   2. Chronic anxiety  LORazepam (ATIVAN) 0.5 MG Tab   3. Attention deficit disorder of adult with hyperactivity  sertraline (ZOLOFT) 50 MG Tab    TSH   4. GERD without esophagitis  COMP METABOLIC PANEL    TSH    CBC WITHOUT DIFFERENTIAL   5. Hypercholesterolemia with hypertriglyceridemia  COMP METABOLIC PANEL    LIPID PROFILE   6. Urine protein increased  URINALYSIS,CULTURE IF INDICATED         Followup: Return in about 3 months (around 10/26/2018), or if symptoms worsen or fail to improve.

## 2018-08-23 ENCOUNTER — OFFICE VISIT (OUTPATIENT)
Dept: MEDICAL GROUP | Facility: MEDICAL CENTER | Age: 48
End: 2018-08-23
Payer: COMMERCIAL

## 2018-08-23 VITALS
BODY MASS INDEX: 36.02 KG/M2 | RESPIRATION RATE: 14 BRPM | HEIGHT: 64 IN | TEMPERATURE: 97.7 F | OXYGEN SATURATION: 98 % | DIASTOLIC BLOOD PRESSURE: 90 MMHG | SYSTOLIC BLOOD PRESSURE: 136 MMHG | HEART RATE: 94 BPM | WEIGHT: 211 LBS

## 2018-08-23 DIAGNOSIS — F41.9 CHRONIC ANXIETY: ICD-10-CM

## 2018-08-23 DIAGNOSIS — M62.838 NECK MUSCLE SPASM: ICD-10-CM

## 2018-08-23 DIAGNOSIS — F51.04 PSYCHOPHYSIOLOGIC INSOMNIA: ICD-10-CM

## 2018-08-23 DIAGNOSIS — M62.830 SPASM OF BACK MUSCLES: ICD-10-CM

## 2018-08-23 PROCEDURE — 99213 OFFICE O/P EST LOW 20 MIN: CPT | Performed by: FAMILY MEDICINE

## 2018-08-23 RX ORDER — CYCLOBENZAPRINE HCL 5 MG
5 TABLET ORAL 3 TIMES DAILY PRN
Qty: 60 TAB | Refills: 2 | Status: SHIPPED | OUTPATIENT
Start: 2018-08-23 | End: 2018-09-22 | Stop reason: SDUPTHER

## 2018-08-23 RX ORDER — LORAZEPAM 0.5 MG/1
0.5 TABLET ORAL EVERY 8 HOURS PRN
Qty: 60 TAB | Refills: 2 | Status: SHIPPED | OUTPATIENT
Start: 2018-08-23 | End: 2018-09-22 | Stop reason: SDUPTHER

## 2018-08-23 RX ORDER — QUETIAPINE FUMARATE 25 MG/1
25 TABLET, FILM COATED ORAL
Qty: 30 TAB | Refills: 4 | Status: SHIPPED | OUTPATIENT
Start: 2018-08-23 | End: 2018-09-25 | Stop reason: SINTOL

## 2018-08-23 NOTE — LETTER
August 23, 2018        Patient: Aruna Smith   YOB: 1970   Date of Visit: 8/23/2018           To Whom It May Concern:    It is my medical opinion that Aruna Smith is off work 8/23/18 and 8/24/18 for medical reasons.    If you have any questions or concerns, please don't hesitate to call.      Sincerely,          Oleksandr Jefferson M.D.    [address] [id number] [phone] (Phone)  [phone] (Fax)

## 2018-08-23 NOTE — PROGRESS NOTES
Chief Complaint   Patient presents with   • Headache   • Anxiety       Subjective:     HPI:   Aruna Smith presents today with the followin. Spasm of back muscles/neck muscle spasm  She is very stressed at work.  She is getting very prominent neck and back muscle spasms.  The cyclobenzaprine does help and allows her to sleep at night.  It is renewed.  She is very stressed from work and this has exacerbated both her spasms and her anxiety.      2. Psychophysiologic insomnia  Patient has quite severe anxiety and has at times taken a large amount of lorazepam out of desperation trying to sleep. Represented to the patient that I do not want her to do that.  States that severe sleep problems and difficulty unwinding has been a feature of her anxiety and psychiatric issues for very long time.  She feels the sertraline is somewhat helpful but seems to make her more ivory.  Discussed adding low-dose Seroquel.  If this is successful we may actually end up stopping the sertraline.  Denies any hallucinations.    3. Chronic anxiety  She has frustration and anxiety at work which is sometimes debilitating.  She continues to use the Lorazepam as needed, usually episodically.  Review of [facility] board of pharmacy interface shows a filling pattern that is consistent with that.        Patient Active Problem List    Diagnosis Date Noted   • Psychophysiologic insomnia 2018   • Hypercholesterolemia with hypertriglyceridemia    • GERD without esophagitis    • Colitis, indeterminate    • Obesity, Class II, BMI 35-39.9 (CMS-HCC) 2014   • Attention deficit disorder of adult with hyperactivity    • Family history of diabetes mellitus type II    • Seasonal allergic rhinitis    • Seasonal allergies    • Anxiety        Current medicines (including changes today)  Current Outpatient Prescriptions   Medication Sig Dispense Refill   • QUEtiapine (SEROQUEL) 25 MG Tab Take 1 Tab by mouth every bedtime. 30 Tab 4   •  "cyclobenzaprine (FLEXERIL) 5 MG tablet Take 1 Tab by mouth 3 times a day as needed for Muscle Spasms for up to 90 days. 60 Tab 2   • LORazepam (ATIVAN) 0.5 MG Tab Take 1 Tab by mouth every 8 hours as needed for up to 90 days. 60 Tab 2   • sertraline (ZOLOFT) 50 MG Tab Take 1 Tab by mouth every day. 30 Tab 6   • buPROPion (WELLBUTRIN XL) 300 MG XL tablet TAKE ONE TABLET BY MOUTH EVERY MORNING  GENERIC FOR WELLBUTRIN XL 30 Tab 4   • montelukast (SINGULAIR) 10 MG Tab TAKE ONE TABLET BY MOUTH DAILY 30 Tab 3   • raNITidine (ZANTAC) 150 MG Tab TAKE ONE TABLET BY MOUTH TWICE A DAY 60 Tab 3   • Loratadine (CLARITIN PO) Take  by mouth.       No current facility-administered medications for this visit.        Allergies   Allergen Reactions   • Xanax [Alprazolam Xr]      shaking       ROS: As per HPI       Objective:     Blood pressure 136/90, pulse 94, temperature 36.5 °C (97.7 °F), resp. rate 14, height 1.626 m (5' 4\"), weight 95.7 kg (211 lb), SpO2 98 %, not currently breastfeeding. Body mass index is 36.22 kg/m².    Physical Exam:  Constitutional: Well-developed and well-nourished. Not diaphoretic. No distress. Lucid and fluent.  Skin: Skin is warm and dry. No rash noted.  Excoriation left arm  Head: Atraumatic without lesions.  Eyes: Conjunctivae and extraocular motions are normal. Pupils are equal, round, and reactive to light. No scleral icterus.   Mouth/Throat: Tongue normal. Oropharynx is clear and moist. Posterior pharynx without erythema or exudates.  Neck: Marked posterior neck spasm, range of motion is full though somewhat tender. No thyromegaly present. No cervical or supraclavicular lymphadenopathy. No JVD or carotid bruits appreciated  Cardiovascular: Regular rate and rhythm.  Normal S1, S2 without murmur appreciated.  Chest: Effort normal. Clear to auscultation throughout. No adventitious sounds.   Extremities: No cyanosis, clubbing, erythema, nor edema.   Neurological: Alert and oriented x 3. Tremor both " hands, moderate.  Psychiatric:  Behavior, mood, and affect are appropriate.  She is lucid and fluent though she is somewhat tearful.    She will get her lab tests done     Assessment and Plan:     48 y.o. female with the following issues:    1. Spasm of back muscles  cyclobenzaprine (FLEXERIL) 5 MG tablet   2. Neck muscle spasm     3. Psychophysiologic insomnia  QUEtiapine (SEROQUEL) 25 MG Tab   4. Chronic anxiety  LORazepam (ATIVAN) 0.5 MG Tab         Followup: Return in about 3 months (around 11/23/2018), or if symptoms worsen or fail to improve.

## 2018-09-22 DIAGNOSIS — M62.830 SPASM OF BACK MUSCLES: ICD-10-CM

## 2018-09-22 DIAGNOSIS — K21.9 GERD WITHOUT ESOPHAGITIS: ICD-10-CM

## 2018-09-22 DIAGNOSIS — F41.9 CHRONIC ANXIETY: ICD-10-CM

## 2018-09-24 RX ORDER — CYCLOBENZAPRINE HCL 5 MG
5-10 TABLET ORAL 3 TIMES DAILY PRN
Qty: 60 TAB | Refills: 1 | Status: SHIPPED
Start: 2018-09-24 | End: 2018-12-27 | Stop reason: SDUPTHER

## 2018-09-24 RX ORDER — RANITIDINE 150 MG/1
150 TABLET ORAL 2 TIMES DAILY
Qty: 60 TAB | Refills: 2 | Status: SHIPPED | OUTPATIENT
Start: 2018-09-24 | End: 2018-12-27 | Stop reason: SDUPTHER

## 2018-09-24 RX ORDER — LORAZEPAM 0.5 MG/1
0.5 TABLET ORAL EVERY 8 HOURS PRN
Qty: 60 TAB | Refills: 1 | Status: SHIPPED
Start: 2018-09-24 | End: 2018-09-28 | Stop reason: SDUPTHER

## 2018-09-28 DIAGNOSIS — F41.9 CHRONIC ANXIETY: ICD-10-CM

## 2018-09-28 RX ORDER — LORAZEPAM 0.5 MG/1
0.5 TABLET ORAL EVERY 8 HOURS PRN
Qty: 60 TAB | Refills: 0 | Status: SHIPPED
Start: 2018-09-28 | End: 2018-10-18

## 2018-10-27 DIAGNOSIS — F90.9 ATTENTION DEFICIT DISORDER OF ADULT WITH HYPERACTIVITY: ICD-10-CM

## 2018-10-27 DIAGNOSIS — F41.9 ANXIETY: ICD-10-CM

## 2018-10-27 RX ORDER — LORAZEPAM 0.5 MG/1
TABLET ORAL
Qty: 60 TAB | Refills: 0 | Status: SHIPPED
Start: 2018-10-27 | End: 2018-11-16

## 2018-10-27 RX ORDER — BUPROPION HYDROCHLORIDE 300 MG/1
TABLET ORAL
Qty: 30 TAB | Refills: 3 | Status: SHIPPED | OUTPATIENT
Start: 2018-10-27 | End: 2019-03-02 | Stop reason: SDUPTHER

## 2018-11-16 ENCOUNTER — OFFICE VISIT (OUTPATIENT)
Dept: URGENT CARE | Facility: PHYSICIAN GROUP | Age: 48
End: 2018-11-16
Payer: COMMERCIAL

## 2018-11-16 VITALS
RESPIRATION RATE: 16 BRPM | HEIGHT: 64 IN | TEMPERATURE: 97.8 F | BODY MASS INDEX: 36.54 KG/M2 | DIASTOLIC BLOOD PRESSURE: 78 MMHG | SYSTOLIC BLOOD PRESSURE: 130 MMHG | WEIGHT: 214 LBS | OXYGEN SATURATION: 95 % | HEART RATE: 85 BPM

## 2018-11-16 DIAGNOSIS — J01.00 ACUTE NON-RECURRENT MAXILLARY SINUSITIS: ICD-10-CM

## 2018-11-16 DIAGNOSIS — R05.9 COUGH: ICD-10-CM

## 2018-11-16 PROCEDURE — 99214 OFFICE O/P EST MOD 30 MIN: CPT | Performed by: PHYSICIAN ASSISTANT

## 2018-11-16 RX ORDER — FLUTICASONE PROPIONATE 50 MCG
1 SPRAY, SUSPENSION (ML) NASAL 2 TIMES DAILY
Qty: 16 G | Refills: 0 | Status: SHIPPED | OUTPATIENT
Start: 2018-11-16

## 2018-11-16 RX ORDER — AMOXICILLIN AND CLAVULANATE POTASSIUM 875; 125 MG/1; MG/1
1 TABLET, FILM COATED ORAL 2 TIMES DAILY
Qty: 20 TAB | Refills: 0 | Status: SHIPPED | OUTPATIENT
Start: 2018-11-16 | End: 2018-11-27

## 2018-11-16 RX ORDER — CODEINE PHOSPHATE AND GUAIFENESIN 10; 100 MG/5ML; MG/5ML
SOLUTION ORAL
Qty: 70 ML | Refills: 0 | Status: SHIPPED | OUTPATIENT
Start: 2018-11-16 | End: 2018-11-23

## 2018-11-16 NOTE — PROGRESS NOTES
Chief Complaint   Patient presents with   • Cough     congestion x 5 days    • Otalgia     left ear pain started yesterday        HISTORY OF PRESENT ILLNESS: Patient is a 48 y.o. female who presents today for 5 days of sinus pressure/congestion/left ear pain. She has had predominantly dry cough that has kept her awake despite Robitussin.  She has not had fevers or chills.  No body aches.  She is not having wheezing, tightness in chest or SOB.   Diffuse sinus pressure and pain however slightly more on the left side.  Does have seasonal allergies but more in spring.     Patient Active Problem List    Diagnosis Date Noted   • Psychophysiologic insomnia 08/23/2018   • Hypercholesterolemia with hypertriglyceridemia    • GERD without esophagitis    • Colitis, indeterminate    • Obesity, Class II, BMI 35-39.9 (CMS-[facility]) 07/30/2014   • Attention deficit disorder of adult with hyperactivity    • Family history of diabetes mellitus type II    • Seasonal allergic rhinitis    • Seasonal allergies    • Anxiety        Allergies:Quetiapine and Xanax [alprazolam xr]    Current Outpatient Prescriptions Ordered in [facility]   Medication Sig Dispense Refill   • guaifenesin-codeine (CHERATUSSIN AC) Solution oral solution 1-2 teaspoons at bedtime prn cough for up to 7 days. No driving 70 mL 0   • amoxicillin-clavulanate (AUGMENTIN) 875-125 MG Tab Take 1 Tab by mouth 2 times a day. 20 Tab 0   • fluticasone (FLONASE ALLERGY RELIEF) 50 MCG/ACT nasal spray Spray 1 Spray in nose 2 times a day. 16 g 0   • buPROPion (WELLBUTRIN XL) 300 MG XL tablet TAKE ONE TABLET BY MOUTH EVERY MORNING 30 Tab 3   • LORazepam (ATIVAN) 0.5 MG Tab TAKE ONE TABLET BY MOUTH EVERY 8 HOURS AS NEEDED FOR ANXIETY FOR UP TO 20 DAYS 60 Tab 0   • raNITidine (ZANTAC) 150 MG Tab Take 1 Tab by mouth 2 times a day. 60 Tab 2   • cyclobenzaprine (FLEXERIL) 5 MG tablet Take 1-2 Tabs by mouth 3 times a day as needed for Moderate Pain. 60 Tab 1   • sertraline (ZOLOFT) 50 MG Tab Take 1  "Tab by mouth every day. 30 Tab 6   • montelukast (SINGULAIR) 10 MG Tab TAKE ONE TABLET BY MOUTH DAILY 30 Tab 3   • Loratadine (CLARITIN PO) Take  by mouth.       No current Epic-ordered facility-administered medications on file.        Past Medical History:   Diagnosis Date   • Allergic rhinitis    • Anxiety    • Attention deficit disorder of adult with hyperactivity    • Colitis, indeterminate    • Family history of diabetes mellitus type II    • GERD without esophagitis    • Hypercholesterolemia with hypertriglyceridemia    • Other specified symptom associated with female genital organs    • Seasonal allergies    • Shingles 5/2014       Social History   Substance Use Topics   • Smoking status: Former Smoker     Packs/day: 0.50     Years: 4.00     Types: Cigarettes     Quit date: 9/1/1988   • Smokeless tobacco: Never Used   • Alcohol use No      Comment: very rare, around twice a year       Family Status   Relation Status   • Fa (Not Specified)   • Mo (Not Specified)   • Bro (Not Specified)   • MGFa (Not Specified)     Family History   Problem Relation Age of Onset   • Heart Disease Father         mitral valve replacement, rheumatic fever/congestive heart failure   • Diabetes Mother    • Hypertension Mother    • Hypertension Brother    • Hypertension Maternal Grandfather        ROS:  Review of Systems   Constitutional: SEE HPI  HENT: SEE HPI  Eyes: Negative for blurred vision.   Respiratory: SEE HPI  Cardiovascular: Negative for chest pain, palpitations, orthopnea and leg swelling.   Gastrointestinal: Negative for heartburn, nausea, vomiting and abdominal pain.   Genitourinary: Negative for dysuria, urgency and frequency.     Exam:  Blood pressure 130/78, pulse 85, temperature 36.6 °C (97.8 °F), temperature source Temporal, resp. rate 16, height 1.626 m (5' 4\"), weight 97.1 kg (214 lb), SpO2 95 %, not currently breastfeeding.  General:  Well nourished, well developed female in NAD  Eyes: PERRLA, EOM within normal " limits, no conjunctival injection, no scleral icterus, visual fields and acuity grossly intact.  Ears: Normal shape and symmetry, no tenderness, no discharge. External canals are without any significant edema or erythema. TM right mildly erythematous, TM left WNL. Gross auditory acuity is intact.  No mastoid tenderness, no periauricular lymphadenopathy or tenderness  Nose: Symmetrical, left maxillary sinus moderatelyTTP, boggy turbinates.    Mouth: reasonable hygiene, no erythema exudates or tonsillar enlargement.  Neck: no masses, range of motion within normal limits, no tracheal deviation. No lymphadenopathy  Pulmonary: Normal respiratory effort, no wheezes, crackles, or rhonchi.  Cardiovascular: regular rate and rhythm without murmurs, rubs, or gallops.  Skin: No visible rashes or lesion. Warm, pink, dry.   Extremities: no clubbing, cyanosis, or edema.  Neuro: A&O x 3. Speech normal/clear.  Normal gait.         Assessment/Plan:  1. Acute non-recurrent maxillary sinusitis  amoxicillin-clavulanate (AUGMENTIN) 875-125 MG Tab    fluticasone (FLONASE ALLERGY RELIEF) 50 MCG/ACT nasal spray   2. Cough  guaifenesin-codeine (CHERATUSSIN AC) Solution oral solution         -sinus care discussed.    -saline/Flonase.    -codeine cough syrup as above.  Emphasized drowsy and no driving on this medicine.  Patient expressed understanding of this.   -contingent antibiotic prescription given to patient to fill upon meeting criteria of guidelines discussed.   -work note provided       Supportive care, differential diagnoses, and indications for immediate follow-up discussed with patient.   Pathogenesis of diagnosis discussed including typical length and natural progression.   Instructed to return to clinic or nearest emergency department for any change in condition, further concerns, or worsening of symptoms.  Patient states understanding of the plan of care and discharge instructions.        Charlee Kovacs P.A.-C.

## 2018-11-16 NOTE — LETTER
November 16, 2018         Patient: Aruna Smith   YOB: 1970   Date of Visit: 11/16/2018           To Whom it May Concern:    Aruna Smith was seen in my clinic on 11/16/2018.  Please excuse her from missed work today.     If you have any questions or concerns, please don't hesitate to call.        Sincerely,           Charlee Kovacs P.A.-C.  Electronically Signed

## 2018-11-27 ENCOUNTER — OFFICE VISIT (OUTPATIENT)
Dept: MEDICAL GROUP | Facility: MEDICAL CENTER | Age: 48
End: 2018-11-27
Payer: COMMERCIAL

## 2018-11-27 VITALS
HEIGHT: 64 IN | RESPIRATION RATE: 16 BRPM | HEART RATE: 74 BPM | BODY MASS INDEX: 37.05 KG/M2 | WEIGHT: 217 LBS | OXYGEN SATURATION: 94 % | SYSTOLIC BLOOD PRESSURE: 122 MMHG | TEMPERATURE: 98.4 F | DIASTOLIC BLOOD PRESSURE: 82 MMHG

## 2018-11-27 DIAGNOSIS — Z23 NEED FOR IMMUNIZATION AGAINST INFLUENZA: ICD-10-CM

## 2018-11-27 DIAGNOSIS — Z83.3 FAMILY HISTORY OF DIABETES MELLITUS TYPE II: ICD-10-CM

## 2018-11-27 DIAGNOSIS — E66.9 OBESITY, CLASS II, BMI 35-39.9: ICD-10-CM

## 2018-11-27 PROCEDURE — 90471 IMMUNIZATION ADMIN: CPT | Performed by: FAMILY MEDICINE

## 2018-11-27 PROCEDURE — 90686 IIV4 VACC NO PRSV 0.5 ML IM: CPT | Performed by: FAMILY MEDICINE

## 2018-11-27 PROCEDURE — 99213 OFFICE O/P EST LOW 20 MIN: CPT | Mod: 25 | Performed by: FAMILY MEDICINE

## 2018-11-27 NOTE — PROGRESS NOTES
Chief Complaint   Patient presents with   • Immunizations       Subjective:     HPI:   Aruna Smith presents today with the followin. Need for immunization against influenza  Administered today    2. Family history of diabetes mellitus type II  Patient does have a family history of diabetes.  Discussed again the importance of getting her blood work done.  She had lost the paperwork.  Explained to her that these orders are on file at [facility] and if she calls and makes an appointment they will pull up the orders for her.  Patient voices understanding and states she will get this completed.  Denies increased nocturia.  Denies feeling particularly tired or ill.  Denies increase in blurred vision.    3. Obesity, Class II, BMI 35-39.9 (CMS-HCC)  Patient continues to struggle with her weight.  This may be a little unfair as this is just a few days after Thanksgiving but she has gained another 3 pounds.  Patient is looking at various diets.  Discussed a healthy eating pattern for life.  Discussed increasing vegetables and ideally using a plate method that would improve her health overall.        Patient Active Problem List    Diagnosis Date Noted   • Psychophysiologic insomnia 2018   • Hypercholesterolemia with hypertriglyceridemia    • GERD without esophagitis    • Colitis, indeterminate    • Obesity, Class II, BMI 35-39.9 (CMS-[facility]) 2014   • Attention deficit disorder of adult with hyperactivity    • Family history of diabetes mellitus type II    • Seasonal allergic rhinitis    • Seasonal allergies    • Anxiety        Current medicines (including changes today)  Current Outpatient Prescriptions   Medication Sig Dispense Refill   • fluticasone (FLONASE ALLERGY RELIEF) 50 MCG/ACT nasal spray Spray 1 Spray in nose 2 times a day. 16 g 0   • buPROPion (WELLBUTRIN XL) 300 MG XL tablet TAKE ONE TABLET BY MOUTH EVERY MORNING 30 Tab 3   • raNITidine (ZANTAC) 150 MG Tab Take 1 Tab by mouth 2 times a day.  "60 Tab 2   • cyclobenzaprine (FLEXERIL) 5 MG tablet Take 1-2 Tabs by mouth 3 times a day as needed for Moderate Pain. 60 Tab 1   • sertraline (ZOLOFT) 50 MG Tab Take 1 Tab by mouth every day. 30 Tab 6   • montelukast (SINGULAIR) 10 MG Tab TAKE ONE TABLET BY MOUTH DAILY 30 Tab 3   • Loratadine (CLARITIN PO) Take  by mouth.       No current facility-administered medications for this visit.        Allergies   Allergen Reactions   • Quetiapine      Severe anger   • Xanax [Alprazolam Xr]      shaking       ROS: As per HPI       Objective:     Blood pressure 122/82, pulse 74, temperature 36.9 °C (98.4 °F), resp. rate 16, height 1.626 m (5' 4\"), weight 98.4 kg (217 lb), SpO2 94 %, not currently breastfeeding. Body mass index is 37.25 kg/m².    Physical Exam:  Constitutional: Well-developed and well-nourished. Not diaphoretic. No distress. Lucid and fluent.  Skin: Skin is warm and dry. No rash noted.  Head: Atraumatic without lesions.  Eyes: Conjunctivae and extraocular motions are normal. Pupils are equal, round, and reactive to light. No scleral icterus.   Mouth/Throat: Tongue normal. Oropharynx is clear and moist. Posterior pharynx without erythema or exudates.  Neck: Supple, trachea midline. No thyromegaly present. No cervical or supraclavicular lymphadenopathy. No JVD or carotid bruits appreciated  Cardiovascular: Regular rate and rhythm.  Normal S1, S2 without murmur appreciated.  Chest: Effort normal. Clear to auscultation throughout. No adventitious sounds.   Extremities: No cyanosis, clubbing, erythema, nor edema.   Neurological: Alert and oriented x 3.   Psychiatric:  Behavior, mood, and affect are appropriate.       Assessment and Plan:     48 y.o. female with the following issues:    1. Need for immunization against influenza  Influenza Vaccine Quad Injection >3Y (PF)   2. Family history of diabetes mellitus type II     3. Obesity, Class II, BMI 35-39.9 (CMS-[facility])           Followup: Return in about 4 months " (around 3/27/2019), or if symptoms worsen or fail to improve.

## 2019-01-28 DIAGNOSIS — M62.830 SPASM OF BACK MUSCLES: ICD-10-CM

## 2019-01-28 RX ORDER — CYCLOBENZAPRINE HCL 5 MG
5 TABLET ORAL 3 TIMES DAILY PRN
Qty: 90 TAB | Refills: 0 | Status: SHIPPED
Start: 2019-01-28 | End: 2019-02-27

## 2019-01-28 NOTE — TELEPHONE ENCOUNTER
Was the patient seen in the last year in this department? Yes    Does patient have an active prescription for medications requested? Yes    Received Request Via: Pharmacy     Pharmacy requesting a refill for patient's Flexeril.

## 2019-03-02 DIAGNOSIS — F90.9 ATTENTION DEFICIT DISORDER OF ADULT WITH HYPERACTIVITY: ICD-10-CM

## 2019-03-02 DIAGNOSIS — M62.830 MUSCLE SPASM OF BACK: ICD-10-CM

## 2019-03-04 RX ORDER — CYCLOBENZAPRINE HCL 5 MG
TABLET ORAL
Qty: 90 TAB | Refills: 2 | Status: SHIPPED
Start: 2019-03-04 | End: 2019-05-28 | Stop reason: SDUPTHER

## 2019-03-04 RX ORDER — BUPROPION HYDROCHLORIDE 300 MG/1
TABLET ORAL
Qty: 30 TAB | Refills: 2 | Status: SHIPPED | OUTPATIENT
Start: 2019-03-04 | End: 2019-06-10 | Stop reason: SDUPTHER

## 2019-04-01 DIAGNOSIS — F41.9 ANXIETY: ICD-10-CM

## 2019-04-02 RX ORDER — LORAZEPAM 0.5 MG/1
TABLET ORAL
Qty: 60 TAB | Refills: 0 | Status: SHIPPED
Start: 2019-04-02 | End: 2019-06-10 | Stop reason: SDUPTHER

## 2019-05-11 DIAGNOSIS — F90.9 ATTENTION DEFICIT DISORDER OF ADULT WITH HYPERACTIVITY: ICD-10-CM

## 2019-05-24 ENCOUNTER — OFFICE VISIT (OUTPATIENT)
Dept: URGENT CARE | Facility: PHYSICIAN GROUP | Age: 49
End: 2019-05-24
Payer: COMMERCIAL

## 2019-05-24 VITALS
TEMPERATURE: 97.4 F | OXYGEN SATURATION: 91 % | WEIGHT: 221.4 LBS | SYSTOLIC BLOOD PRESSURE: 132 MMHG | HEIGHT: 64 IN | BODY MASS INDEX: 37.8 KG/M2 | DIASTOLIC BLOOD PRESSURE: 90 MMHG | HEART RATE: 83 BPM

## 2019-05-24 DIAGNOSIS — A08.4 VIRAL GASTROENTERITIS: ICD-10-CM

## 2019-05-24 DIAGNOSIS — R10.30 LOWER ABDOMINAL PAIN: ICD-10-CM

## 2019-05-24 LAB
APPEARANCE UR: CLEAR
BILIRUB UR STRIP-MCNC: NORMAL MG/DL
COLOR UR AUTO: YELLOW
GLUCOSE UR STRIP.AUTO-MCNC: NORMAL MG/DL
KETONES UR STRIP.AUTO-MCNC: NORMAL MG/DL
LEUKOCYTE ESTERASE UR QL STRIP.AUTO: NORMAL
NITRITE UR QL STRIP.AUTO: NORMAL
PH UR STRIP.AUTO: 5.5 [PH] (ref 5–8)
PROT UR QL STRIP: NORMAL MG/DL
RBC UR QL AUTO: NORMAL
SP GR UR STRIP.AUTO: 1.01
UROBILINOGEN UR STRIP-MCNC: NORMAL MG/DL

## 2019-05-24 PROCEDURE — 81002 URINALYSIS NONAUTO W/O SCOPE: CPT | Performed by: PHYSICIAN ASSISTANT

## 2019-05-24 PROCEDURE — 99214 OFFICE O/P EST MOD 30 MIN: CPT | Performed by: PHYSICIAN ASSISTANT

## 2019-05-24 RX ORDER — ONDANSETRON 4 MG/1
4 TABLET, FILM COATED ORAL EVERY 4 HOURS PRN
Qty: 20 TAB | Refills: 0 | Status: SHIPPED | OUTPATIENT
Start: 2019-05-24 | End: 2019-05-29

## 2019-05-24 ASSESSMENT — ENCOUNTER SYMPTOMS
NAUSEA: 1
SHORTNESS OF BREATH: 0
HEADACHES: 1
VOMITING: 1
ABDOMINAL PAIN: 0
MYALGIAS: 0
SORE THROAT: 0
EYE REDNESS: 0
DIARRHEA: 1
COUGH: 0
EYE DISCHARGE: 0
FEVER: 1

## 2019-05-24 NOTE — PROGRESS NOTES
Subjective:      Aruna Smith is a 48 y.o. female who presents with Nausea (vomiting, fever (100.2), sinus pressure, x2 days )          Gastroenteritis    This is a new problem. Episode onset: 4 days ago. The problem occurs 2 to 4 times per day. The problem has been resolved. The emesis has an appearance of stomach contents. The maximum temperature recorded prior to her arrival was 102 - 102.9 F (now resolved). Associated symptoms include diarrhea, a fever and headaches. Pertinent negatives include no abdominal pain, chest pain, coughing, myalgias or URI. She has tried acetaminophen for the symptoms.     The patient presents to clinic c/o nausea, vomiting, and diarrhea x 4 days. The patient reports an associated fever of 102 x 3 days ago, now resolved. The patient states her vomiting is also now resolved, but she reports persistent nausea. The patient also reports diarrhea. The patient denies abdominal pain, dysuria, sore throat, cough, congestion, shortness of breath, and rash.   The patient has taken Tylenol for her symptoms. The patient states she is to drink PO fluids, but is only able to eat small amounts at a time.     PMH:  has a past medical history of Allergic rhinitis; Anxiety; Attention deficit disorder of adult with hyperactivity; Colitis, indeterminate; Family history of diabetes mellitus type II; GERD without esophagitis; Hypercholesterolemia with hypertriglyceridemia; Other specified symptom associated with female genital organs; Seasonal allergies; and Shingles (5/2014).  MEDS:   Current Outpatient Prescriptions:   •  sertraline (ZOLOFT) 50 MG Tab, TAKE ONE TABLET BY MOUTH DAILY, Disp: 30 Tab, Rfl: 5  •  buPROPion (WELLBUTRIN XL) 300 MG XL tablet, TAKE ONE TABLET BY MOUTH EVERY MORNING, Disp: 30 Tab, Rfl: 2  •  cyclobenzaprine (FLEXERIL) 5 MG tablet, TAKE ONE TABLET BY MOUTH THREE TIMES A DAY AS NEEDED FOR MUSCLE SPASMS FOR UP TO 30 DAYS  GENERIC FOR FLEXERIL, Disp: 90 Tab, Rfl: 2  •  raNITidine  "(ZANTAC) 150 MG Tab, TAKE ONE TABLET BY MOUTH TWICE A DAY, Disp: 60 Tab, Rfl: 4  •  montelukast (SINGULAIR) 10 MG Tab, TAKE ONE TABLET BY MOUTH DAILY, Disp: 30 Tab, Rfl: 4  •  fluticasone (FLONASE ALLERGY RELIEF) 50 MCG/ACT nasal spray, Spray 1 Spray in nose 2 times a day., Disp: 16 g, Rfl: 0  •  Loratadine (CLARITIN PO), Take  by mouth., Disp: , Rfl:   ALLERGIES:   Allergies   Allergen Reactions   • Quetiapine      Severe anger   • Xanax [Alprazolam Xr]      shaking     SURGHX:   Past Surgical History:   Procedure Laterality Date   • VAGINAL HYSTERECTOMY TOTAL  10/29/2012    Performed by Michael Stewart M.D. at [facility]   • CYSTOSCOPY  10/29/2012    Performed by Michael Stewart M.D. at [facility]     SOCHX:  reports that she quit smoking about 30 years ago. Her smoking use included Cigarettes. She has a 2.00 pack-year smoking history. She has never used smokeless tobacco. She reports that she does not drink alcohol or use drugs.  FH: Family history was reviewed, no pertinent findings to report    Review of Systems   Constitutional: Positive for fever.   HENT: Positive for ear pain (left ear). Negative for congestion and sore throat.    Eyes: Negative for discharge and redness.   Respiratory: Negative for cough and shortness of breath.    Cardiovascular: Negative for chest pain and leg swelling.   Gastrointestinal: Positive for diarrhea, nausea and vomiting. Negative for abdominal pain.   Genitourinary: Negative for dysuria.   Musculoskeletal: Negative for joint pain and myalgias.   Skin: Negative for rash.   Neurological: Positive for headaches.          Objective:     /90 (BP Location: Right arm, Patient Position: Sitting, BP Cuff Size: Large adult)   Pulse 83   Temp 36.3 °C (97.4 °F) (Temporal)   Ht 1.626 m (5' 4\")   Wt 100.4 kg (221 lb 6.4 oz)   SpO2 91%   BMI 38.00 kg/m²      Physical Exam   Constitutional: She is oriented to person, place, and time. She " appears well-developed and well-nourished. No distress.   HENT:   Head: Normocephalic and atraumatic.   Right Ear: External ear normal.   Left Ear: External ear normal.   Nose: Nose normal.   Mouth/Throat: Oropharynx is clear and moist.   Eyes: Conjunctivae and EOM are normal.   Neck: Normal range of motion. Neck supple.   Cardiovascular: Normal rate, regular rhythm and normal heart sounds.    Pulmonary/Chest: Effort normal and breath sounds normal.   Abdominal: Soft. Bowel sounds are normal. There is tenderness (diffuse tenderness to the lower abdomen). There is no rebound, no guarding and no CVA tenderness.   Neurological: She is alert and oriented to person, place, and time.   Skin: Skin is warm and dry.          Progress:  POCT Urinalysis:  GLU: Negative  LUCILLE: Negative  KET: Negative  S.015  BLO: Negative  PH: 5.5  PRO: Negative  URO: 0.2  NIT: Negative  SEBASTIAN: Negative       Assessment/Plan:     1. Viral gastroenteritis    - ondansetron (ZOFRAN) 4 MG Tab tablet; Take 1 Tab by mouth every four hours as needed for Nausea/Vomiting for up to 5 days.  Dispense: 20 Tab; Refill: 0    2. Lower abdominal pain    - POCT Urinalysis    The patient's presenting symptoms and physical exam are consistent with acute viral gastroenteritis.  The patient's urinalysis today in clinic was negative, indicating her symptoms are unlikely due to an acute urinary tract infection.  Given the patient is currently not experiencing persistent, localized abdominal pain or persistent fever, and the presence of only mild diffuse abdominal tenderness on physical exam, it is unlikely her symptoms are due to an acute abdominal process.  Will prescribe the patient Zofran for symptomatic relief of her nausea.  Recommend OTC medications for symptomatic relief of her diarrhea.  Discussed strict return precautions with the patient, and she verbalized understanding.    OTC Tylenol or Motrin for fever/discomfort.  OTC anti-diarrheal medications for  symptomatic relief, such as Pepto Bismol or Imodium   Drink plenty of fluids  [place] diet  Rest  Work note provided  Follow-up with PCP as needed  Return to clinic or go to the ED if symptoms worsen or fail to improve, or if the patient should develop worsening/increasing/persistent nausea/vomiting, worsening/increasing/persistent diarrhea, worsening/increasing/persistent abdominal pain, urinary symptoms, fever/chills, unable to drink p.o. fluids, and or any concerning symptoms.    Discussed plan with the patient, and she agrees to the above.

## 2019-05-24 NOTE — LETTER
[facility] [place]  [address]  [place] [address]     May 24, 2019    Patient: Aruna Smith   YOB: 1970   Date of Visit: 5/24/2019       To Whom It May Concern:    Aruna Smith was seen and treated in our department on 5/24/2019. Please excuse her from work today.     Sincerely,     Dede Caro P.A.-C.

## 2019-05-28 ENCOUNTER — OFFICE VISIT (OUTPATIENT)
Dept: MEDICAL GROUP | Facility: MEDICAL CENTER | Age: 49
End: 2019-05-28
Payer: COMMERCIAL

## 2019-05-28 VITALS
HEIGHT: 64 IN | WEIGHT: 225 LBS | TEMPERATURE: 98 F | SYSTOLIC BLOOD PRESSURE: 128 MMHG | DIASTOLIC BLOOD PRESSURE: 82 MMHG | RESPIRATION RATE: 14 BRPM | BODY MASS INDEX: 38.41 KG/M2 | OXYGEN SATURATION: 98 % | HEART RATE: 89 BPM

## 2019-05-28 DIAGNOSIS — M62.830 MUSCLE SPASM OF BACK: ICD-10-CM

## 2019-05-28 DIAGNOSIS — E66.9 OBESITY, CLASS II, BMI 35-39.9: ICD-10-CM

## 2019-05-28 DIAGNOSIS — Z12.39 SCREENING BREAST EXAMINATION: ICD-10-CM

## 2019-05-28 PROCEDURE — 99213 OFFICE O/P EST LOW 20 MIN: CPT | Performed by: FAMILY MEDICINE

## 2019-05-28 RX ORDER — CYCLOBENZAPRINE HCL 5 MG
5 TABLET ORAL 3 TIMES DAILY PRN
Qty: 90 TAB | Refills: 2 | Status: SHIPPED | OUTPATIENT
Start: 2019-05-28 | End: 2020-12-04 | Stop reason: SDUPTHER

## 2019-05-28 ASSESSMENT — PATIENT HEALTH QUESTIONNAIRE - PHQ9: CLINICAL INTERPRETATION OF PHQ2 SCORE: 0

## 2019-05-28 NOTE — PROGRESS NOTES
Chief Complaint   Patient presents with   • Spasms       Subjective:     HPI:   Aruna Smith presents today with the followin. Muscle spasm of back  Patient states she is doing pretty well on her current regimen.  She will need a renewal on the cyclobenzaprine soon.  She has been using this just as needed and has been stretching regularly.    2. Obesity, Class II, BMI 35-39.9 (CMS-HCC)  Her weight is a little bit up.  Patient has been fighting with this.  Patient states she is getting back on to a better dietary regimen.    Mammogram order discussed and placed      Patient Active Problem List    Diagnosis Date Noted   • Psychophysiologic insomnia 2018   • Hypercholesterolemia with hypertriglyceridemia    • GERD without esophagitis    • Colitis, indeterminate    • Obesity, Class II, BMI 35-39.9 (CMS-[facility]) 2014   • Attention deficit disorder of adult with hyperactivity    • Family history of diabetes mellitus type II    • Seasonal allergic rhinitis    • Seasonal allergies    • Anxiety        Current medicines (including changes today)  Current Outpatient Prescriptions   Medication Sig Dispense Refill   • cyclobenzaprine (FLEXERIL) 5 MG tablet Take 1 Tab by mouth 3 times a day as needed for Muscle Spasms. 90 Tab 2   • ondansetron (ZOFRAN) 4 MG Tab tablet Take 1 Tab by mouth every four hours as needed for Nausea/Vomiting for up to 5 days. 20 Tab 0   • sertraline (ZOLOFT) 50 MG Tab TAKE ONE TABLET BY MOUTH DAILY 30 Tab 5   • buPROPion (WELLBUTRIN XL) 300 MG XL tablet TAKE ONE TABLET BY MOUTH EVERY MORNING 30 Tab 2   • raNITidine (ZANTAC) 150 MG Tab TAKE ONE TABLET BY MOUTH TWICE A DAY 60 Tab 4   • montelukast (SINGULAIR) 10 MG Tab TAKE ONE TABLET BY MOUTH DAILY 30 Tab 4   • fluticasone (FLONASE ALLERGY RELIEF) 50 MCG/ACT nasal spray Spray 1 Spray in nose 2 times a day. 16 g 0   • Loratadine (CLARITIN PO) Take  by mouth.       No current facility-administered medications for this visit.   "      Allergies   Allergen Reactions   • Quetiapine      Severe anger   • Xanax [Alprazolam Xr]      shaking       ROS: As per HPI       Objective:     /82   Pulse 89   Temp 36.7 °C (98 °F)   Resp 14   Ht 1.626 m (5' 4\")   Wt 102.1 kg (225 lb)   SpO2 98%  Body mass index is 38.62 kg/m².    Physical Exam:  Constitutional: Well-developed and well-nourished. Not diaphoretic. No distress. Lucid and fluent.  Skin: Skin is warm and dry. No rash noted.  Head: Atraumatic without lesions.  Eyes: Conjunctivae and extraocular motions are normal. Pupils are equal, round, and reactive to light. No scleral icterus.   Mouth/Throat: Tongue normal. Oropharynx is clear and moist. Posterior pharynx without erythema or exudates.  Neck: Supple, trachea midline. No thyromegaly present. No cervical or supraclavicular lymphadenopathy. No JVD or carotid bruits appreciated  Cardiovascular: Regular rate and rhythm.  Normal S1, S2 without murmur appreciated.  Chest: Effort normal. Clear to auscultation throughout. No adventitious sounds.   Back: Moderate muscle spasm.  Moderate tenderness to palpation spinous processes but generally stable.  Extremities: No cyanosis, clubbing, erythema, nor edema.   Neurological: Alert and oriented x 3.  Movements are symmetric.  Psychiatric:  Behavior, mood, and affect are appropriate.       Assessment and Plan:     48 y.o. female with the following issues:    1. Muscle spasm of back  cyclobenzaprine (FLEXERIL) 5 MG tablet   2. Obesity, Class II, BMI 35-39.9 (CMS-HCC)  Patient identified as having weight management issue.  Appropriate orders and counseling given.   3. Screening breast examination  MA-SCREENING MAMMO BILAT W/TOMOSYNTHESIS W/CAD         Followup: Return in about 6 months (around 11/28/2019), or if symptoms worsen or fail to improve.  "

## 2019-06-10 DIAGNOSIS — K21.9 GERD WITHOUT ESOPHAGITIS: ICD-10-CM

## 2019-06-10 DIAGNOSIS — F90.9 ATTENTION DEFICIT DISORDER OF ADULT WITH HYPERACTIVITY: ICD-10-CM

## 2019-06-10 DIAGNOSIS — F41.9 ANXIETY: ICD-10-CM

## 2019-06-10 RX ORDER — LORAZEPAM 0.5 MG/1
1 TABLET ORAL EVERY 8 HOURS PRN
Qty: 60 TAB | Refills: 0 | Status: SHIPPED
Start: 2019-06-10 | End: 2019-09-11 | Stop reason: SDUPTHER

## 2019-06-10 RX ORDER — BUPROPION HYDROCHLORIDE 300 MG/1
300 TABLET ORAL EVERY MORNING
Qty: 30 TAB | Refills: 4 | Status: SHIPPED | OUTPATIENT
Start: 2019-06-10 | End: 2019-11-05 | Stop reason: SDUPTHER

## 2019-06-10 RX ORDER — RANITIDINE 150 MG/1
150 TABLET ORAL 2 TIMES DAILY
Qty: 60 TAB | Refills: 4 | Status: SHIPPED | OUTPATIENT
Start: 2019-06-10 | End: 2019-11-03 | Stop reason: SDUPTHER

## 2019-08-08 ENCOUNTER — APPOINTMENT (OUTPATIENT)
Dept: RADIOLOGY | Facility: MEDICAL CENTER | Age: 49
End: 2019-08-08
Attending: FAMILY MEDICINE
Payer: COMMERCIAL

## 2019-08-16 ENCOUNTER — APPOINTMENT (OUTPATIENT)
Dept: RADIOLOGY | Facility: MEDICAL CENTER | Age: 49
End: 2019-08-16
Attending: FAMILY MEDICINE
Payer: COMMERCIAL

## 2019-08-23 ENCOUNTER — HOSPITAL ENCOUNTER (OUTPATIENT)
Dept: RADIOLOGY | Facility: MEDICAL CENTER | Age: 49
End: 2019-08-23
Attending: FAMILY MEDICINE
Payer: COMMERCIAL

## 2019-08-23 DIAGNOSIS — Z12.39 SCREENING BREAST EXAMINATION: ICD-10-CM

## 2019-08-23 PROCEDURE — 77063 BREAST TOMOSYNTHESIS BI: CPT

## 2019-09-09 DIAGNOSIS — F41.9 ANXIETY: ICD-10-CM

## 2019-09-11 RX ORDER — LORAZEPAM 0.5 MG/1
1 TABLET ORAL EVERY 8 HOURS PRN
Qty: 60 TAB | Refills: 0 | Status: SHIPPED
Start: 2019-09-11 | End: 2019-10-01

## 2019-09-28 ENCOUNTER — OFFICE VISIT (OUTPATIENT)
Dept: URGENT CARE | Facility: PHYSICIAN GROUP | Age: 49
End: 2019-09-28
Payer: COMMERCIAL

## 2019-09-28 VITALS
DIASTOLIC BLOOD PRESSURE: 76 MMHG | BODY MASS INDEX: 38.41 KG/M2 | OXYGEN SATURATION: 93 % | HEIGHT: 64 IN | RESPIRATION RATE: 16 BRPM | SYSTOLIC BLOOD PRESSURE: 114 MMHG | TEMPERATURE: 98.4 F | HEART RATE: 74 BPM | WEIGHT: 225 LBS

## 2019-09-28 DIAGNOSIS — J06.9 UPPER RESPIRATORY TRACT INFECTION, UNSPECIFIED TYPE: ICD-10-CM

## 2019-09-28 PROCEDURE — 99214 OFFICE O/P EST MOD 30 MIN: CPT | Performed by: PHYSICIAN ASSISTANT

## 2019-09-28 RX ORDER — AMOXICILLIN AND CLAVULANATE POTASSIUM 875; 125 MG/1; MG/1
1 TABLET, FILM COATED ORAL 2 TIMES DAILY
Qty: 14 TAB | Refills: 0 | Status: SHIPPED | OUTPATIENT
Start: 2019-09-28 | End: 2019-10-05

## 2019-09-28 RX ORDER — METHYLPREDNISOLONE 4 MG/1
TABLET ORAL
Qty: 21 TAB | Refills: 0 | Status: SHIPPED | OUTPATIENT
Start: 2019-09-28 | End: 2019-12-03

## 2019-09-28 ASSESSMENT — ENCOUNTER SYMPTOMS
WHEEZING: 0
ABDOMINAL PAIN: 0
VOMITING: 1
HEADACHES: 1
EYE DISCHARGE: 0
COUGH: 1
EYE REDNESS: 0
FEVER: 0
SHORTNESS OF BREATH: 0
SORE THROAT: 0
SINUS PAIN: 1
MYALGIAS: 1
NAUSEA: 0

## 2019-09-28 NOTE — PROGRESS NOTES
Subjective:      Aruna Smith is a 49 y.o. female who presents with Cough (Sinus headache/pressure ongoing 3 weeks.)        Cough   This is a new problem. Episode onset: x 3 weeks ago. The problem has been gradually worsening. The problem occurs constantly. The cough is productive of sputum. Associated symptoms include headaches, myalgias and nasal congestion. Pertinent negatives include no chest pain, ear pain, eye redness, fever, rash, sore throat, shortness of breath or wheezing. Nothing aggravates the symptoms. She has tried OTC cough suppressant (and Claritin) for the symptoms. The treatment provided mild relief.      PMH:  has a past medical history of Allergic rhinitis, Anxiety, Attention deficit disorder of adult with hyperactivity, Colitis, indeterminate, Family history of diabetes mellitus type II, GERD without esophagitis, Hypercholesterolemia with hypertriglyceridemia, Other specified symptom associated with female genital organs, Seasonal allergies, and Shingles (5/2014).  MEDS:   Current Outpatient Medications:   •  LORazepam (ATIVAN) 0.5 MG Tab, Take 2 Tabs by mouth every 8 hours as needed for Anxiety for up to 20 days., Disp: 60 Tab, Rfl: 0  •  raNITidine (ZANTAC) 150 MG Tab, Take 1 Tab by mouth 2 times a day., Disp: 60 Tab, Rfl: 4  •  buPROPion (WELLBUTRIN XL) 300 MG XL tablet, Take 1 Tab by mouth every morning., Disp: 30 Tab, Rfl: 4  •  cyclobenzaprine (FLEXERIL) 5 MG tablet, Take 1 Tab by mouth 3 times a day as needed for Muscle Spasms., Disp: 90 Tab, Rfl: 2  •  sertraline (ZOLOFT) 50 MG Tab, TAKE ONE TABLET BY MOUTH DAILY, Disp: 30 Tab, Rfl: 5  •  montelukast (SINGULAIR) 10 MG Tab, TAKE ONE TABLET BY MOUTH DAILY, Disp: 30 Tab, Rfl: 4  •  fluticasone (FLONASE ALLERGY RELIEF) 50 MCG/ACT nasal spray, Spray 1 Spray in nose 2 times a day., Disp: 16 g, Rfl: 0  •  Loratadine (CLARITIN PO), Take  by mouth., Disp: , Rfl:   ALLERGIES:   Allergies   Allergen Reactions   • Quetiapine      Severe anger  "  • Xanax [Alprazolam Xr]      shaking     SURGHX:   Past Surgical History:   Procedure Laterality Date   • VAGINAL HYSTERECTOMY TOTAL  10/29/2012    Performed by Michael Stewart M.D. at SURGERY SAME DAY [facility]   • CYSTOSCOPY  10/29/2012    Performed by Michael Stewart M.D. at SURGERY SAME DAY [facility] ORS     SOCHX:  reports that she quit smoking about 31 years ago. Her smoking use included cigarettes. She has a 2.00 pack-year smoking history. She has never used smokeless tobacco. She reports that she does not drink alcohol or use drugs.  FH: Family history was reviewed, no pertinent findings to report      Review of Systems   Constitutional: Negative for fever.   HENT: Positive for congestion and sinus pain. Negative for ear pain and sore throat.    Eyes: Negative for discharge and redness.   Respiratory: Positive for cough. Negative for shortness of breath and wheezing.    Cardiovascular: Negative for chest pain and leg swelling.   Gastrointestinal: Positive for vomiting (The patient reports post-tussive vomiting.). Negative for abdominal pain and nausea.   Musculoskeletal: Positive for myalgias.   Skin: Negative for rash.   Neurological: Positive for headaches.          Objective:     /76   Pulse 74   Temp 36.9 °C (98.4 °F) (Temporal)   Resp 16   Ht 1.626 m (5' 4\")   Wt 102.1 kg (225 lb)   LMP 10/29/2012   SpO2 93%   BMI 38.62 kg/m²      Physical Exam   Constitutional: She is oriented to person, place, and time. She appears well-developed and well-nourished. No distress.   HENT:   Head: Normocephalic and atraumatic.   Right Ear: Tympanic membrane, external ear and ear canal normal.   Left Ear: Tympanic membrane, external ear and ear canal normal.   Nose: Nose normal.   Mouth/Throat: Oropharynx is clear and moist and mucous membranes are normal. No posterior oropharyngeal erythema. No tonsillar exudate.   Eyes: Conjunctivae and EOM are normal.   Neck: Normal range of motion. Neck supple. "   Cardiovascular: Normal rate, regular rhythm and normal heart sounds.   Pulmonary/Chest: Effort normal and breath sounds normal.   Musculoskeletal: Normal range of motion.   Moves all 4 extremities.   Neurological: She is alert and oriented to person, place, and time.   Skin: Skin is warm and dry.               Assessment/Plan:     1. Upper respiratory tract infection, unspecified type  - amoxicillin-clavulanate (AUGMENTIN) 875-125 MG Tab; Take 1 Tab by mouth 2 times a day for 7 days.  Dispense: 14 Tab; Refill: 0  - methylPREDNISolone (MEDROL DOSEPAK) 4 MG Tablet Therapy Pack; Use as directed.  Dispense: 21 Tab; Refill: 0    Differential diagnoses, supportive care, and indications for immediate follow-up discussed with patient.   Instructed to return to clinic or nearest emergency department for any change in condition, further concerns, or worsening of symptoms.    OTC Tylenol or Motrin for fever/discomfort.  OTC cough/cold medication for symptomatic relief  OTC Supportive Care for Congestion - saline nasal spray or neti pot  Drink plenty of fluids  Follow-up with PCP  Return to clinic or go to the ED if symptoms worsen or fail to improve, or if the patient should develop worsening/increasing cough, congestion, ear pain, sore throat, shortness of breath, wheezing, chest pain, fever/chills, and/or any concerning symptoms.    Discussed plan with the patient, and she agrees to the above.

## 2019-09-28 NOTE — LETTER
[facility] [place]  [address]  [place] [address]     September 28, 2019    Patient: Aruna Smith   YOB: 1970   Date of Visit: 9/28/2019       To Whom It May Concern:    Aruna Smith was seen and treated in our department on 9/28/2019. Please excuse her from work on Friday, 9/27.     Sincerely,     Dede Caro P.A.-C.

## 2019-11-03 DIAGNOSIS — K21.9 GERD WITHOUT ESOPHAGITIS: ICD-10-CM

## 2019-11-04 RX ORDER — RANITIDINE 150 MG/1
TABLET ORAL
Qty: 60 TAB | Refills: 3 | Status: SHIPPED | OUTPATIENT
Start: 2019-11-04 | End: 2020-03-03

## 2019-12-03 ENCOUNTER — OFFICE VISIT (OUTPATIENT)
Dept: MEDICAL GROUP | Facility: MEDICAL CENTER | Age: 49
End: 2019-12-03
Payer: COMMERCIAL

## 2019-12-03 VITALS
HEIGHT: 64 IN | DIASTOLIC BLOOD PRESSURE: 76 MMHG | TEMPERATURE: 97.6 F | RESPIRATION RATE: 14 BRPM | OXYGEN SATURATION: 94 % | HEART RATE: 82 BPM | BODY MASS INDEX: 39.44 KG/M2 | WEIGHT: 231 LBS | SYSTOLIC BLOOD PRESSURE: 122 MMHG

## 2019-12-03 DIAGNOSIS — E78.5 DYSLIPIDEMIA: ICD-10-CM

## 2019-12-03 DIAGNOSIS — E66.9 OBESITY, CLASS II, BMI 35-39.9: ICD-10-CM

## 2019-12-03 DIAGNOSIS — K21.9 GERD WITHOUT ESOPHAGITIS: ICD-10-CM

## 2019-12-03 DIAGNOSIS — F41.9 ANXIETY: ICD-10-CM

## 2019-12-03 PROCEDURE — 99214 OFFICE O/P EST MOD 30 MIN: CPT | Performed by: FAMILY MEDICINE

## 2019-12-03 RX ORDER — CLOSTRIDIUM TETANI TOXOID ANTIGEN (FORMALDEHYDE INACTIVATED), CORYNEBACTERIUM DIPHTHERIAE TOXOID ANTIGEN (FORMALDEHYDE INACTIVATED), BORDETELLA PERTUSSIS TOXOID ANTIGEN (GLUTARALDEHYDE INACTIVATED), BORDETELLA PERTUSSIS FILAMENTOUS HEMAGGLUTININ ANTIGEN (FORMALDEHYDE INACTIVATED), BORDETELLA PERTUSSIS PERTACTIN ANTIGEN, AND BORDETELLA PERTUSSIS FIMBRIAE 2/3 ANTIGEN 5; 2; 2.5; 5; 3; 5 [LF]/.5ML; [LF]/.5ML; UG/.5ML; UG/.5ML; UG/.5ML; UG/.5ML
INJECTION, SUSPENSION INTRAMUSCULAR
Refills: 0 | COMMUNITY
Start: 2019-11-22 | End: 2020-06-04

## 2019-12-03 RX ORDER — LORAZEPAM 0.5 MG/1
0.5 TABLET ORAL EVERY 8 HOURS PRN
Qty: 60 TAB | Refills: 5 | Status: SHIPPED | OUTPATIENT
Start: 2019-12-03 | End: 2020-01-13

## 2019-12-03 RX ORDER — INFLUENZA A VIRUS A/BRISBANE/02/2018 IVR-190 (H1N1) ANTIGEN (FORMALDEHYDE INACTIVATED), INFLUENZA A VIRUS A/KANSAS/14/2017 X-327 (H3N2) ANTIGEN (FORMALDEHYDE INACTIVATED), INFLUENZA B VIRUS B/PHUKET/3073/2013 ANTIGEN (FORMALDEHYDE INACTIVATED), AND INFLUENZA B VIRUS B/MARYLAND/15/2016 BX-69A ANTIGEN (FORMALDEHYDE INACTIVATED) 15; 15; 15; 15 UG/.5ML; UG/.5ML; UG/.5ML; UG/.5ML
INJECTION, SUSPENSION INTRAMUSCULAR
Refills: 0 | COMMUNITY
Start: 2019-09-13 | End: 2020-06-04

## 2019-12-03 NOTE — PROGRESS NOTES
Chief Complaint   Patient presents with   • Anxiety   • Hyperlipidemia   • Gastrophageal Reflux       Subjective:     HPI:   Aruna Smith presents today with the followin. Anxiety  Patient has chronic anxiety disorder.  She has worked with psychiatry in the past.  She has been stable on her Lorazepam and sertraline regimen.  She states the bupropion and sertraline together have been very helpful.  They allow her to work full-time and concentrate.   review shows no inconsistencies.  The lorazepam is renewed.  Patient denies rebound anxiety or depression from the medication.    2. Dyslipidemia  Patient denies chest pain, chest pressure, palpitations or exertional shortness of breath. Patient is not on lipid-lowering medication.  She had mild elevation in the past which was mostly triglyceride elevation. Patient is a former smoker.  She quit over 30 years ago.  Patient takes no aspirin daily. Patient has no history of myocardial infarction, stroke or PVD.  Follow-up lab orders discussed and placed    3. Obesity, Class II, BMI 35-39.9 (CMS-[facility])  Discussed, she is working on this.  Has been stress eating.  Brother  suddenly at 58 of MI.      4. GERD without esophagitis  The patient feels the current medication regimen of ranitidine bid is controlling the gastroesophageal reflux symptoms well. Denies dysphagia, reflux symptoms, acidity, abdominal pain or visible blood or mucus in the stool. Denies vomiting or hematemesis. Denies burping or abdominal bloating. Patient avoids nonsteroidal anti-inflammatory drugs. Avoids heavy meals or eating within 2 hours of bedtime.    Some trouble sleeping with brother's untimely death and her anxiety.      Patient Active Problem List    Diagnosis Date Noted   • Psychophysiologic insomnia 2018   • Hypercholesterolemia with hypertriglyceridemia    • GERD without esophagitis    • Colitis, indeterminate    • Obesity, Class II, BMI 35-39.9 (CMS-[facility]) 2014   •  "Attention deficit disorder of adult with hyperactivity    • Family history of diabetes mellitus type II    • Seasonal allergic rhinitis    • Seasonal allergies    • Anxiety        Current medicines (including changes today)  Current Outpatient Medications   Medication Sig Dispense Refill   • LORazepam (ATIVAN) 0.5 MG Tab Take 1 Tab by mouth every 8 hours as needed for Anxiety for up to 180 days. 60 Tab 5   • FLUZONE QUADRIVALENT 0.5 ML Suspension Prefilled Syringe injection PHARMACIST ADMINISTERED IMMUNIZATION ADMINISTERED AT TIME OF DISPENSING  0   • ADACEL 5-2-15.5 LF-MCG/0.5 Suspension PHARMACIST ADMINISTERED IMMUNIZATION ADMINISTERED AT TIME OF DISPENSING  0   • buPROPion (WELLBUTRIN XL) 300 MG XL tablet TAKE ONE TABLET BY MOUTH EVERY MORNING 30 Tab 3   • sertraline (ZOLOFT) 50 MG Tab TAKE ONE TABLET BY MOUTH DAILY 30 Tab 4   • raNITidine (ZANTAC) 150 MG Tab TAKE ONE TABLET BY MOUTH TWICE A DAY 60 Tab 3   • cyclobenzaprine (FLEXERIL) 5 MG tablet Take 1 Tab by mouth 3 times a day as needed for Muscle Spasms. 90 Tab 2   • montelukast (SINGULAIR) 10 MG Tab TAKE ONE TABLET BY MOUTH DAILY 30 Tab 4   • fluticasone (FLONASE ALLERGY RELIEF) 50 MCG/ACT nasal spray Spray 1 Spray in nose 2 times a day. 16 g 0   • Loratadine (CLARITIN PO) Take  by mouth.       No current facility-administered medications for this visit.        Allergies   Allergen Reactions   • Quetiapine      Severe anger   • Xanax [Alprazolam Xr]      shaking       ROS: As per HPI       Objective:     /76   Pulse 82   Temp 36.4 °C (97.6 °F)   Resp 14   Ht 1.626 m (5' 4\")   Wt 104.8 kg (231 lb)   SpO2 94%  Body mass index is 39.65 kg/m².    Physical Exam:  Constitutional: Well-developed and well-nourished. Not diaphoretic. No distress. Lucid and fluent.  Skin: Skin is warm and dry. No rash noted.  Head: Atraumatic without lesions.  Eyes: Conjunctivae and extraocular motions are normal. Pupils are equal, round, and reactive to light. No scleral " icterus.   Mouth/Throat: Tongue normal. Oropharynx is clear and moist. Posterior pharynx without erythema or exudates.  Neck: Supple, trachea midline. No thyromegaly present. No cervical or supraclavicular lymphadenopathy. No JVD or carotid bruits appreciated  Cardiovascular: Regular rate and rhythm.  Normal S1, S2 without murmur appreciated.  Chest: Effort normal. Clear to auscultation throughout. No adventitious sounds.   Abdomen: Soft, non tender, and without distention. Active bowel sounds in all four quadrants. No rebound, guarding, masses or hepatosplenomegaly.  Extremities: No cyanosis, clubbing, erythema, nor edema.   Neurological: Alert and oriented x 3. No tremor noted.  Movements strong and symmetric.   Psychiatric:  Behavior, mood, and affect are appropriate.       Assessment and Plan:     49 y.o. female with the following issues:    1. Anxiety  LORazepam (ATIVAN) 0.5 MG Tab   2. Dyslipidemia  Comp Metabolic Panel    Lipid Profile   3. Obesity, Class II, BMI 35-39.9 (CMS-[facility])     4. GERD without esophagitis  Comp Metabolic Panel    CBC WITHOUT DIFFERENTIAL         Followup: Return in about 6 months (around 6/3/2020), or if symptoms worsen or fail to improve.

## 2020-02-04 ENCOUNTER — HOSPITAL ENCOUNTER (OUTPATIENT)
Dept: LAB | Facility: MEDICAL CENTER | Age: 50
End: 2020-02-04
Attending: FAMILY MEDICINE
Payer: COMMERCIAL

## 2020-02-04 DIAGNOSIS — K21.9 GERD WITHOUT ESOPHAGITIS: ICD-10-CM

## 2020-02-04 DIAGNOSIS — E78.5 DYSLIPIDEMIA: ICD-10-CM

## 2020-02-04 LAB
ALBUMIN SERPL BCP-MCNC: 4.3 G/DL (ref 3.2–4.9)
ALBUMIN/GLOB SERPL: 1.3 G/DL
ALP SERPL-CCNC: 82 U/L (ref 30–99)
ALT SERPL-CCNC: 16 U/L (ref 2–50)
ANION GAP SERPL CALC-SCNC: 7 MMOL/L (ref 0–11.9)
AST SERPL-CCNC: 17 U/L (ref 12–45)
BILIRUB SERPL-MCNC: 0.8 MG/DL (ref 0.1–1.5)
BUN SERPL-MCNC: 14 MG/DL (ref 8–22)
CALCIUM SERPL-MCNC: 9.7 MG/DL (ref 8.5–10.5)
CHLORIDE SERPL-SCNC: 105 MMOL/L (ref 96–112)
CHOLEST SERPL-MCNC: 161 MG/DL (ref 100–199)
CO2 SERPL-SCNC: 28 MMOL/L (ref 20–33)
CREAT SERPL-MCNC: 0.97 MG/DL (ref 0.5–1.4)
ERYTHROCYTE [DISTWIDTH] IN BLOOD BY AUTOMATED COUNT: 45 FL (ref 35.9–50)
FASTING STATUS PATIENT QL REPORTED: NORMAL
GLOBULIN SER CALC-MCNC: 3.2 G/DL (ref 1.9–3.5)
GLUCOSE SERPL-MCNC: 114 MG/DL (ref 65–99)
HCT VFR BLD AUTO: 46.8 % (ref 37–47)
HDLC SERPL-MCNC: 35 MG/DL
HGB BLD-MCNC: 14 G/DL (ref 12–16)
LDLC SERPL CALC-MCNC: 84 MG/DL
MCH RBC QN AUTO: 25.2 PG (ref 27–33)
MCHC RBC AUTO-ENTMCNC: 29.9 G/DL (ref 33.6–35)
MCV RBC AUTO: 84.2 FL (ref 81.4–97.8)
PLATELET # BLD AUTO: 304 K/UL (ref 164–446)
PMV BLD AUTO: 11.1 FL (ref 9–12.9)
POTASSIUM SERPL-SCNC: 4 MMOL/L (ref 3.6–5.5)
PROT SERPL-MCNC: 7.5 G/DL (ref 6–8.2)
RBC # BLD AUTO: 5.56 M/UL (ref 4.2–5.4)
SODIUM SERPL-SCNC: 140 MMOL/L (ref 135–145)
TRIGL SERPL-MCNC: 211 MG/DL (ref 0–149)
WBC # BLD AUTO: 7.2 K/UL (ref 4.8–10.8)

## 2020-02-04 PROCEDURE — 80061 LIPID PANEL: CPT

## 2020-02-04 PROCEDURE — 80053 COMPREHEN METABOLIC PANEL: CPT

## 2020-02-04 PROCEDURE — 36415 COLL VENOUS BLD VENIPUNCTURE: CPT

## 2020-02-04 PROCEDURE — 85027 COMPLETE CBC AUTOMATED: CPT

## 2020-02-08 DIAGNOSIS — J30.1 SEASONAL ALLERGIC RHINITIS DUE TO POLLEN: ICD-10-CM

## 2020-02-08 RX ORDER — MONTELUKAST SODIUM 10 MG/1
TABLET ORAL
Qty: 30 TAB | Refills: 3 | Status: SHIPPED | OUTPATIENT
Start: 2020-02-08 | End: 2020-06-08 | Stop reason: SDUPTHER

## 2020-03-01 DIAGNOSIS — K21.9 GERD WITHOUT ESOPHAGITIS: ICD-10-CM

## 2020-03-03 RX ORDER — RANITIDINE 150 MG/1
TABLET ORAL
Qty: 60 TAB | Refills: 2 | Status: SHIPPED | OUTPATIENT
Start: 2020-03-03 | End: 2020-04-28 | Stop reason: RX

## 2020-03-07 DIAGNOSIS — F90.9 ATTENTION DEFICIT DISORDER OF ADULT WITH HYPERACTIVITY: ICD-10-CM

## 2020-03-07 RX ORDER — BUPROPION HYDROCHLORIDE 300 MG/1
TABLET ORAL
Qty: 30 TAB | Refills: 2 | Status: SHIPPED | OUTPATIENT
Start: 2020-03-07 | End: 2020-06-08 | Stop reason: SDUPTHER

## 2020-03-31 DIAGNOSIS — F90.9 ATTENTION DEFICIT DISORDER OF ADULT WITH HYPERACTIVITY: ICD-10-CM

## 2020-04-28 ENCOUNTER — TELEPHONE (OUTPATIENT)
Dept: MEDICAL GROUP | Facility: MEDICAL CENTER | Age: 50
End: 2020-04-28

## 2020-04-28 DIAGNOSIS — K21.9 GERD WITHOUT ESOPHAGITIS: ICD-10-CM

## 2020-04-28 RX ORDER — FAMOTIDINE 20 MG/1
20 TABLET, FILM COATED ORAL 2 TIMES DAILY
Qty: 180 TAB | Refills: 2 | Status: SHIPPED | OUTPATIENT
Start: 2020-04-28 | End: 2021-06-17

## 2020-06-04 ENCOUNTER — OFFICE VISIT (OUTPATIENT)
Dept: MEDICAL GROUP | Facility: MEDICAL CENTER | Age: 50
End: 2020-06-04
Payer: COMMERCIAL

## 2020-06-04 VITALS
HEART RATE: 90 BPM | HEIGHT: 64 IN | RESPIRATION RATE: 14 BRPM | DIASTOLIC BLOOD PRESSURE: 82 MMHG | OXYGEN SATURATION: 100 % | WEIGHT: 226 LBS | SYSTOLIC BLOOD PRESSURE: 118 MMHG | TEMPERATURE: 98 F | BODY MASS INDEX: 38.58 KG/M2

## 2020-06-04 DIAGNOSIS — E66.9 OBESITY, CLASS II, BMI 35-39.9: ICD-10-CM

## 2020-06-04 DIAGNOSIS — B35.1 ONYCHOMYCOSIS OF TOENAIL: ICD-10-CM

## 2020-06-04 PROCEDURE — 99213 OFFICE O/P EST LOW 20 MIN: CPT | Performed by: FAMILY MEDICINE

## 2020-06-04 ASSESSMENT — FIBROSIS 4 INDEX: FIB4 SCORE: 0.7

## 2020-06-04 ASSESSMENT — PATIENT HEALTH QUESTIONNAIRE - PHQ9: CLINICAL INTERPRETATION OF PHQ2 SCORE: 0

## 2020-06-04 NOTE — PROGRESS NOTES
Chief Complaint   Patient presents with   • Nail Problem   • Obesity       Subjective:     HPI:   Aruna Smith presents today with the followin. Onychomycosis of toenail  She has been struggling with thickened and sometimes painful toenails.  She is using tea tree oil and has been using a disposable file to file the nails down.  She still has significant thickened nails on both feet.  This is not just the great toe but also several other toes.  Circulation appears to be normal, feet are warm.  Discussed her options.  She does not want to do oral medication and I think that is a good decision.  Discussed various topicals that are available over-the-counter and perseverance.  I think she is done a fairly good job.  She just needs a little stronger antifungal than tea tree oil.    2. Obesity, Class II, BMI 35-39.9 (CMS-[facility])  Patient has been working hard on her weight.  She feels much more comfortable working from home than she does at the office and finds she is snacking less and able to follow a good diet much better.  She is also trying to be more physically active.  She is hoping that her work will allow her to continue to work virtually at least a couple days a week.    Working virtually from home.  Has worked quite well.        Patient Active Problem List    Diagnosis Date Noted   • Psychophysiologic insomnia 2018   • Hypercholesterolemia with hypertriglyceridemia    • GERD without esophagitis    • Colitis, indeterminate    • Obesity, Class II, BMI 35-39.9 (CMS-[facility]) 2014   • Attention deficit disorder of adult with hyperactivity    • Family history of diabetes mellitus type II    • Seasonal allergic rhinitis    • Seasonal allergies    • Anxiety        Current medicines (including changes today)  Current Outpatient Medications   Medication Sig Dispense Refill   • famotidine (PEPCID) 20 MG Tab Take 1 Tab by mouth 2 times a day. 180 Tab 2   • sertraline (ZOLOFT) 50 MG Tab TAKE ONE TABLET BY  "MOUTH DAILY (GENERIC FOR ZOLOFT) 30 Tab 3   • buPROPion (WELLBUTRIN XL) 300 MG XL tablet TAKE ONE TABLET BY MOUTH EVERY MORNING 30 Tab 2   • montelukast (SINGULAIR) 10 MG Tab TAKE ONE TABLET BY MOUTH DAILY 30 Tab 3   • cyclobenzaprine (FLEXERIL) 5 MG tablet Take 1 Tab by mouth 3 times a day as needed for Muscle Spasms. 90 Tab 2   • fluticasone (FLONASE ALLERGY RELIEF) 50 MCG/ACT nasal spray Spray 1 Spray in nose 2 times a day. 16 g 0   • Loratadine (CLARITIN PO) Take  by mouth.       No current facility-administered medications for this visit.        Allergies   Allergen Reactions   • Quetiapine      Severe anger   • Xanax [Alprazolam Xr]      shaking       ROS: As per HPI       Objective:     /82   Pulse 90   Temp 36.7 °C (98 °F)   Resp 14   Ht 1.626 m (5' 4\")   Wt 102.5 kg (226 lb)   SpO2 100%  Body mass index is 38.79 kg/m².    Physical Exam:  Constitutional: Well-developed and well-nourished. Not diaphoretic. No distress. Lucid and fluent.  Patient and personnel wearing masks.  Skin: Skin is warm and dry. No rash noted.  Toenails on both feet have yellow thickening and a crumbly appearance.  There is no surrounding erythema.  There is no drainage.  The area is nontender.  Feet are warm and perfusion is normal.  Head: Atraumatic without lesions.  Eyes: Conjunctivae and extraocular motions are normal. Pupils are equal, round, and reactive to light. No scleral icterus.   Neck: Supple, trachea midline. No thyromegaly present. No cervical or supraclavicular lymphadenopathy. No JVD appreciated  Extremities: No cyanosis, clubbing, erythema, nor edema.   Neurological: Alert and oriented x 3.   Psychiatric:  Behavior, mood, and affect are appropriate.       Assessment and Plan:     49 y.o. female with the following issues:    1. Onychomycosis of toenail     2. Obesity, Class II, BMI 35-39.9 (CMS-[facility])           Followup: Return in about 6 months (around 12/4/2020), or if symptoms worsen or fail to improve.  "

## 2020-06-08 DIAGNOSIS — J30.1 SEASONAL ALLERGIC RHINITIS DUE TO POLLEN: ICD-10-CM

## 2020-06-08 DIAGNOSIS — F90.9 ATTENTION DEFICIT DISORDER OF ADULT WITH HYPERACTIVITY: ICD-10-CM

## 2020-06-08 RX ORDER — BUPROPION HYDROCHLORIDE 300 MG/1
300 TABLET ORAL EVERY MORNING
Qty: 30 TAB | Refills: 6 | Status: SHIPPED | OUTPATIENT
Start: 2020-06-08 | End: 2020-12-04 | Stop reason: SDUPTHER

## 2020-06-08 RX ORDER — MONTELUKAST SODIUM 10 MG/1
10 TABLET ORAL DAILY
Qty: 30 TAB | Refills: 6 | Status: SHIPPED | OUTPATIENT
Start: 2020-06-08 | End: 2023-08-30

## 2020-08-11 DIAGNOSIS — F90.9 ATTENTION DEFICIT DISORDER OF ADULT WITH HYPERACTIVITY: ICD-10-CM

## 2020-11-08 DIAGNOSIS — F90.9 ATTENTION DEFICIT DISORDER OF ADULT WITH HYPERACTIVITY: ICD-10-CM

## 2020-11-27 ENCOUNTER — OFFICE VISIT (OUTPATIENT)
Dept: URGENT CARE | Facility: PHYSICIAN GROUP | Age: 50
End: 2020-11-27
Payer: COMMERCIAL

## 2020-11-27 VITALS
TEMPERATURE: 97.4 F | HEIGHT: 64 IN | RESPIRATION RATE: 16 BRPM | SYSTOLIC BLOOD PRESSURE: 120 MMHG | OXYGEN SATURATION: 96 % | HEART RATE: 96 BPM | BODY MASS INDEX: 38.76 KG/M2 | WEIGHT: 227 LBS | DIASTOLIC BLOOD PRESSURE: 82 MMHG

## 2020-11-27 DIAGNOSIS — H10.9 CONJUNCTIVITIS OF LEFT EYE, UNSPECIFIED CONJUNCTIVITIS TYPE: ICD-10-CM

## 2020-11-27 PROCEDURE — 99214 OFFICE O/P EST MOD 30 MIN: CPT | Performed by: NURSE PRACTITIONER

## 2020-11-27 RX ORDER — POLYMYXIN B SULFATE AND TRIMETHOPRIM 1; 10000 MG/ML; [USP'U]/ML
1 SOLUTION OPHTHALMIC 4 TIMES DAILY
Qty: 10 ML | Refills: 0 | Status: SHIPPED | OUTPATIENT
Start: 2020-11-27 | End: 2020-12-04

## 2020-11-27 RX ORDER — AMOXICILLIN AND CLAVULANATE POTASSIUM 875; 125 MG/1; MG/1
1 TABLET, FILM COATED ORAL 2 TIMES DAILY
Qty: 14 TAB | Refills: 0 | Status: SHIPPED | OUTPATIENT
Start: 2020-11-27 | End: 2020-12-04

## 2020-11-27 ASSESSMENT — FIBROSIS 4 INDEX: FIB4 SCORE: 0.71

## 2020-11-27 ASSESSMENT — VISUAL ACUITY
OS_CC: 20/40
OD_CC: 20/25

## 2020-11-28 ASSESSMENT — ENCOUNTER SYMPTOMS
EYE REDNESS: 1
PHOTOPHOBIA: 0
WHEEZING: 0
MEMORY LOSS: 0
MYALGIAS: 0
HEARTBURN: 0
SHORTNESS OF BREATH: 0
NAUSEA: 0
PALPITATIONS: 0
FOREIGN BODY SENSATION: 1
COUGH: 0
HEADACHES: 0
CONSTIPATION: 0
BLURRED VISION: 0
TINGLING: 0
DOUBLE VISION: 0
SORE THROAT: 0
EYE ITCHING: 0
EYE PAIN: 1
FEVER: 0
EYE DISCHARGE: 1
DIZZINESS: 0
DIARRHEA: 0
BACK PAIN: 0
VOMITING: 0
CHILLS: 0
ORTHOPNEA: 0

## 2020-11-28 ASSESSMENT — VISUAL ACUITY: OU: 1

## 2020-11-28 NOTE — PROGRESS NOTES
"Subjective:      Aruna Smith is a 50 y.o. female who presents with Conjunctivitis (left pink eye, painful, watery, x2 days )            Eye Problem   The left eye is affected. This is a new problem. The current episode started yesterday. The problem occurs constantly. The problem has been gradually worsening. There was no injury mechanism. The pain is at a severity of 4/10. The pain is moderate. There is no known exposure to pink eye. She does not wear contacts. Associated symptoms include an eye discharge, eye redness and a foreign body sensation. Pertinent negatives include no blurred vision, double vision, fever, itching, nausea, photophobia, recent URI, tingling or vomiting. She has tried nothing for the symptoms. The treatment provided no relief.       Review of Systems   Constitutional: Negative for chills and fever.   HENT: Negative for ear pain and sore throat.    Eyes: Positive for pain, discharge and redness. Negative for blurred vision, double vision, photophobia and itching.   Respiratory: Negative for cough, shortness of breath and wheezing.    Cardiovascular: Negative for chest pain, palpitations, orthopnea and leg swelling.   Gastrointestinal: Negative for constipation, diarrhea, heartburn, nausea and vomiting.   Musculoskeletal: Negative for back pain, joint pain and myalgias.   Skin: Negative for rash.   Neurological: Negative for dizziness, tingling and headaches.   Psychiatric/Behavioral: Negative for memory loss and suicidal ideas.   All other systems reviewed and are negative.         Objective:     /82 (BP Location: Right arm, Patient Position: Sitting, BP Cuff Size: Adult long)   Pulse 96   Temp 36.3 °C (97.4 °F) (Temporal)   Resp 16   Ht 1.626 m (5' 4\")   Wt 103 kg (227 lb)   LMP 10/29/2012   SpO2 96%   BMI 38.96 kg/m²      Physical Exam  Vitals signs reviewed.   Constitutional:       Appearance: Normal appearance.   HENT:      Head: Normocephalic.      Right Ear: External " ear normal.      Left Ear: External ear normal.      Nose: Nose normal. No congestion.      Mouth/Throat:      Mouth: Mucous membranes are moist.   Eyes:      General: Lids are normal. Lids are everted, no foreign bodies appreciated. Vision grossly intact. Gaze aligned appropriately. No visual field deficit.        Left eye: Discharge ( Copious) present.No foreign body or hordeolum.      Extraocular Movements: Extraocular movements intact.      Left eye: Normal extraocular motion and no nystagmus.      Conjunctiva/sclera:      Right eye: Right conjunctiva is not injected.      Left eye: Left conjunctiva is injected. No chemosis.     Pupils: Pupils are equal, round, and reactive to light.      Left eye: Fluorescein uptake present. No corneal abrasion.   Neck:      Musculoskeletal: Normal range of motion.   Cardiovascular:      Rate and Rhythm: Normal rate and regular rhythm.      Pulses: Normal pulses.      Heart sounds: Normal heart sounds. No murmur.   Pulmonary:      Effort: Pulmonary effort is normal.      Breath sounds: Normal breath sounds. No wheezing.   Abdominal:      General: Abdomen is flat. Bowel sounds are normal.      Palpations: Abdomen is soft.      Tenderness: There is no abdominal tenderness.   Musculoskeletal: Normal range of motion.   Lymphadenopathy:      Cervical: No cervical adenopathy.   Skin:     General: Skin is warm and dry.      Capillary Refill: Capillary refill takes less than 2 seconds.   Neurological:      Mental Status: She is alert and oriented to person, place, and time.   Psychiatric:         Mood and Affect: Mood normal.         Behavior: Behavior normal.               Visual Acuity Screening    Right eye Left eye Both eyes   Without correction:      With correction: 20/25 20/40 20/25          Assessment/Plan:        1. Conjunctivitis of left eye, unspecified conjunctivitis type  amoxicillin-clavulanate (AUGMENTIN) 875-125 MG Tab    polymixin-trimethoprim (POLYTRIM) 39741-6.1  UNIT/ML-% Solution     Differential diagnosis, natural history, supportive care, and indications for immediate follow-up discussed at length.    Advised urgent f/u with ER or ophthalmology if she is able to get in today.   Continue warm compresses.    Plan of care, medications and treatments reviewed with patient and or guardian.  Patient and or guardian voices understanding and agrees with the instructions provided. After visit summary reviewed with patient. Patient and or guardian understand the parameters for reevaluation and ER precautions discussed.     Follow up with primary care provider in the next 1-5 days for recheck as needed.  Discussed that urgent care setting has limited resources, therefore any worsening of symptoms should be evaluated in the ER. Patient and or guardian verbalized understanding.     Please note that this dictation was created using voice recognition software. I have made every reasonable attempt to correct obvious errors, but I expect that there are errors of grammar and possibly content that I did not discover before finalizing the note.

## 2020-12-04 ENCOUNTER — TELEMEDICINE (OUTPATIENT)
Dept: MEDICAL GROUP | Facility: MEDICAL CENTER | Age: 50
End: 2020-12-04
Payer: COMMERCIAL

## 2020-12-04 VITALS — HEIGHT: 64 IN | BODY MASS INDEX: 37.56 KG/M2 | WEIGHT: 220 LBS

## 2020-12-04 DIAGNOSIS — F90.9 ATTENTION DEFICIT DISORDER OF ADULT WITH HYPERACTIVITY: ICD-10-CM

## 2020-12-04 DIAGNOSIS — M62.830 MUSCLE SPASM OF BACK: ICD-10-CM

## 2020-12-04 DIAGNOSIS — F41.9 ANXIETY: ICD-10-CM

## 2020-12-04 PROCEDURE — 99213 OFFICE O/P EST LOW 20 MIN: CPT | Mod: 95,CR | Performed by: FAMILY MEDICINE

## 2020-12-04 RX ORDER — BUPROPION HYDROCHLORIDE 300 MG/1
300 TABLET ORAL EVERY MORNING
Qty: 90 TAB | Refills: 3 | Status: SHIPPED | OUTPATIENT
Start: 2020-12-04 | End: 2021-12-27

## 2020-12-04 RX ORDER — CYCLOBENZAPRINE HCL 5 MG
5 TABLET ORAL 3 TIMES DAILY PRN
Qty: 90 TAB | Refills: 2 | Status: SHIPPED | OUTPATIENT
Start: 2020-12-04 | End: 2021-03-22

## 2020-12-04 RX ORDER — LORAZEPAM 0.5 MG/1
0.5 TABLET ORAL EVERY 8 HOURS PRN
Qty: 60 TAB | Refills: 2 | Status: SHIPPED | OUTPATIENT
Start: 2020-12-04 | End: 2021-03-22

## 2020-12-04 ASSESSMENT — FIBROSIS 4 INDEX: FIB4 SCORE: 0.71

## 2020-12-04 NOTE — PROGRESS NOTES
Virtual Visit: Established Patient   This visit was conducted via Zoom  using secure and encrypted videoconferencing technology. The patient was in a private location in the state of [place].    The patient's identity was confirmed and verbal consent was obtained for this virtual visit.      CC: Muscle spasms, anxiety, ADD medication renewals                                                                                                                                      HPI:   Aruna presents today with the following.    1. Muscle spasm of back  Patient states the cyclobenzaprine continues to be very helpful for use as needed for her back muscle spasms.  Denies somnolence or confusion from the regimen.  This is renewed.    2. Anxiety  Patient states her lorazepam continues to be extremely helpful for her anxiety.  PDMP review shows that she is making the medication last.  There are no inconsistencies.  Patient states she is using this as needed.  She has used a little more on the lorazepam in the last few months during the pandemic and with her mother-in-law's illness.  Denies significant somnolence from the regimen.    3. Attention deficit disorder of adult with hyperactivity  Patient continues to take bupropion and sertraline for her ADD.  This regimen has been very helpful for her.  She continues to work full-time.  They are now on distance learning so the environment is a little bit safer.  She and her coworkers continue to mask.      Patient Active Problem List    Diagnosis Date Noted   • Psychophysiologic insomnia 08/23/2018   • Hypercholesterolemia with hypertriglyceridemia    • GERD without esophagitis    • Colitis, indeterminate    • Obesity, Class II, BMI 35-39.9 (CMS-[facility]) 07/30/2014   • Attention deficit disorder of adult with hyperactivity    • Family history of diabetes mellitus type II    • Seasonal allergic rhinitis    • Seasonal allergies    • Anxiety        Current Outpatient Medications  "  Medication Sig Dispense Refill   • cyclobenzaprine (FLEXERIL) 5 mg tablet Take 1 Tab by mouth 3 times a day as needed for Muscle Spasms. 90 Tab 2   • LORazepam (ATIVAN) 0.5 MG Tab Take 1 Tab by mouth every 8 hours as needed for Anxiety for up to 90 days. 60 Tab 2   • buPROPion (WELLBUTRIN XL) 300 MG XL tablet Take 1 Tab by mouth every morning. 90 Tab 3   • sertraline (ZOLOFT) 50 MG Tab Take 1 Tab by mouth every day. 90 Tab 3   • amoxicillin-clavulanate (AUGMENTIN) 875-125 MG Tab Take 1 Tab by mouth 2 times a day for 7 days. 14 Tab 0   • polymixin-trimethoprim (POLYTRIM) 99979-7.1 UNIT/ML-% Solution Administer 1 Drop into the left eye 4 times a day for 7 days. 10 mL 0   • montelukast (SINGULAIR) 10 MG Tab Take 1 Tab by mouth every day. 30 Tab 6   • famotidine (PEPCID) 20 MG Tab Take 1 Tab by mouth 2 times a day. 180 Tab 2   • fluticasone (FLONASE ALLERGY RELIEF) 50 MCG/ACT nasal spray Spray 1 Spray in nose 2 times a day. 16 g 0   • Loratadine (CLARITIN PO) Take  by mouth.       No current facility-administered medications for this visit.          Allergies as of 12/04/2020 - Reviewed 11/28/2020   Allergen Reaction Noted   • Quetiapine  09/25/2018   • Xanax [alprazolam xr]  05/09/2014        ROS:  Denies, chest pain, Shortness of breath, Edema.     Ht 1.626 m (5' 4\")   Wt 99.8 kg (220 lb)   LMP 10/29/2012   BMI 37.76 kg/m²       Physical Exam:  Constitutional: Alert, no distress, well-groomed.  Skin: No rashes in visible areas.  Eye: Round. Conjunctiva clear, lNo icterus.   ENMT: Lips pink without lesions, good dentition, moist mucous membranes. Phonation normal.  Neck: No masses, no thyromegaly. Moves freely without pain.  CV: Pulse as reported by patient  Respiratory: Unlabored respiratory effort, no cough or audible wheeze  Psych: Alert and oriented x3, normal affect and mood.          Assessment and Plan.   50 y.o. female with the following issues.    1. Muscle spasm of back  The medication is helpful and " well-tolerated and is renewed  - cyclobenzaprine (FLEXERIL) 5 mg tablet; Take 1 Tab by mouth 3 times a day as needed for Muscle Spasms.  Dispense: 90 Tab; Refill: 2    2. Anxiety  The medication is helpful and well-tolerated and is renewed  - LORazepam (ATIVAN) 0.5 MG Tab; Take 1 Tab by mouth every 8 hours as needed for Anxiety for up to 90 days.  Dispense: 60 Tab; Refill: 2    3. Attention deficit disorder of adult with hyperactivity  Patient states this regimen continues to be very helpful.  It is renewed.  - buPROPion (WELLBUTRIN XL) 300 MG XL tablet; Take 1 Tab by mouth every morning.  Dispense: 90 Tab; Refill: 3  - sertraline (ZOLOFT) 50 MG Tab; Take 1 Tab by mouth every day.  Dispense: 90 Tab; Refill: 3    Recheck 6 months, sooner as needed.

## 2020-12-11 ENCOUNTER — HOSPITAL ENCOUNTER (OUTPATIENT)
Dept: LAB | Facility: MEDICAL CENTER | Age: 50
End: 2020-12-11
Payer: COMMERCIAL

## 2020-12-11 PROCEDURE — U0003 INFECTIOUS AGENT DETECTION BY NUCLEIC ACID (DNA OR RNA); SEVERE ACUTE RESPIRATORY SYNDROME CORONAVIRUS 2 (SARS-COV-2) (CORONAVIRUS DISEASE [COVID-19]), AMPLIFIED PROBE TECHNIQUE, MAKING USE OF HIGH THROUGHPUT TECHNOLOGIES AS DESCRIBED BY CMS-2020-01-R: HCPCS

## 2020-12-12 LAB
COVID ORDER STATUS COVID19: NORMAL
SARS-COV-2 RNA RESP QL NAA+PROBE: NOTDETECTED
SPECIMEN SOURCE: NORMAL

## 2021-02-24 ENCOUNTER — OFFICE VISIT (OUTPATIENT)
Dept: URGENT CARE | Facility: PHYSICIAN GROUP | Age: 51
End: 2021-02-24
Payer: COMMERCIAL

## 2021-02-24 VITALS
HEIGHT: 64 IN | OXYGEN SATURATION: 96 % | WEIGHT: 220 LBS | HEART RATE: 70 BPM | RESPIRATION RATE: 16 BRPM | TEMPERATURE: 97.7 F | DIASTOLIC BLOOD PRESSURE: 80 MMHG | BODY MASS INDEX: 37.56 KG/M2 | SYSTOLIC BLOOD PRESSURE: 130 MMHG

## 2021-02-24 DIAGNOSIS — R42 DIZZINESS: ICD-10-CM

## 2021-02-24 DIAGNOSIS — H65.191 OTHER ACUTE NONSUPPURATIVE OTITIS MEDIA OF RIGHT EAR, RECURRENCE NOT SPECIFIED: ICD-10-CM

## 2021-02-24 PROCEDURE — 99214 OFFICE O/P EST MOD 30 MIN: CPT | Performed by: NURSE PRACTITIONER

## 2021-02-24 RX ORDER — AMOXICILLIN AND CLAVULANATE POTASSIUM 875; 125 MG/1; MG/1
1 TABLET, FILM COATED ORAL 2 TIMES DAILY
Qty: 14 TABLET | Refills: 0 | Status: SHIPPED | OUTPATIENT
Start: 2021-02-24 | End: 2021-03-03

## 2021-02-24 RX ORDER — MECLIZINE HYDROCHLORIDE 25 MG/1
25 TABLET ORAL 3 TIMES DAILY PRN
Qty: 30 TABLET | Refills: 0 | Status: SHIPPED | OUTPATIENT
Start: 2021-02-24

## 2021-02-24 ASSESSMENT — ENCOUNTER SYMPTOMS
FEVER: 0
EYE DISCHARGE: 0
SINUS PAIN: 0
ABDOMINAL PAIN: 0
COUGH: 0
HEADACHES: 0
EYE REDNESS: 0
SORE THROAT: 0
SHORTNESS OF BREATH: 0
WHEEZING: 0
CONSTIPATION: 0
VOMITING: 0
MYALGIAS: 0
NECK PAIN: 0
DIARRHEA: 0
CHILLS: 0
WEAKNESS: 0
DIZZINESS: 0
NAUSEA: 0

## 2021-02-24 ASSESSMENT — FIBROSIS 4 INDEX: FIB4 SCORE: 0.71

## 2021-02-24 NOTE — LETTER
February 24, 2021       Patient: Aruna Smith   YOB: 1970   Date of Visit: 2/24/2021         To Whom It May Concern:    In my medical opinion, I recommend that Aruna Smith be excused from work today for non-contagious, non-respiratory illness.    If you have any questions or concerns, please don't hesitate to call [phone]          Sincerely,          Juliana Prieto A.P.R.N.  Electronically Signed

## 2021-02-24 NOTE — PROGRESS NOTES
"Subjective:      Aruna Smith is a 50 y.o. female who presents with Ear Pain (balance is off, R ear pain, x3 days )            HPI  C/o acute right ear pain x 3 days. H/o seasonal allergies. Experiencing nasal congestion, \"noise in my ear\", PND. No fevers, recent illness. experiencing sensation of losing balance today. Takes Claritin. Requesting work note today.    PMH:  has a past medical history of Allergic rhinitis, Anxiety, Attention deficit disorder of adult with hyperactivity, Colitis, indeterminate, Family history of diabetes mellitus type II, GERD without esophagitis, Hypercholesterolemia with hypertriglyceridemia, Other specified symptom associated with female genital organs, Seasonal allergies, and Shingles (5/2014).  MEDS:   Current Outpatient Medications:   •  meclizine (ANTIVERT) 25 MG Tab, Take 1 tablet by mouth 3 times a day as needed for Dizziness., Disp: 30 tablet, Rfl: 0  •  amoxicillin-clavulanate (AUGMENTIN) 875-125 MG Tab, Take 1 tablet by mouth 2 times a day for 7 days., Disp: 14 tablet, Rfl: 0  •  cyclobenzaprine (FLEXERIL) 5 mg tablet, Take 1 Tab by mouth 3 times a day as needed for Muscle Spasms., Disp: 90 Tab, Rfl: 2  •  LORazepam (ATIVAN) 0.5 MG Tab, Take 1 Tab by mouth every 8 hours as needed for Anxiety for up to 90 days., Disp: 60 Tab, Rfl: 2  •  buPROPion (WELLBUTRIN XL) 300 MG XL tablet, Take 1 Tab by mouth every morning., Disp: 90 Tab, Rfl: 3  •  sertraline (ZOLOFT) 50 MG Tab, Take 1 Tab by mouth every day., Disp: 90 Tab, Rfl: 3  •  montelukast (SINGULAIR) 10 MG Tab, Take 1 Tab by mouth every day., Disp: 30 Tab, Rfl: 6  •  famotidine (PEPCID) 20 MG Tab, Take 1 Tab by mouth 2 times a day., Disp: 180 Tab, Rfl: 2  •  fluticasone (FLONASE ALLERGY RELIEF) 50 MCG/ACT nasal spray, Spray 1 Spray in nose 2 times a day., Disp: 16 g, Rfl: 0  •  Loratadine (CLARITIN PO), Take  by mouth., Disp: , Rfl:   ALLERGIES:   Allergies   Allergen Reactions   • Quetiapine      Severe anger   • Xanax " "[Alprazolam Xr]      shaking     SURGHX:   Past Surgical History:   Procedure Laterality Date   • VAGINAL HYSTERECTOMY TOTAL  10/29/2012    Performed by Michael Stewart M.D. at SURGERY SAME DAY [facility] ORS   • CYSTOSCOPY  10/29/2012    Performed by Michael Stewart M.D. at SURGERY SAME DAY [facility] ORS     SOCHX:  reports that she quit smoking about 32 years ago. Her smoking use included cigarettes. She has a 2.00 pack-year smoking history. She has never used smokeless tobacco. She reports that she does not drink alcohol and does not use drugs.  FH: Family history was reviewed, no pertinent findings to report    Review of Systems   Constitutional: Negative for chills, fever and malaise/fatigue.   HENT: Positive for congestion and ear pain. Negative for ear discharge, hearing loss, sinus pain, sore throat and tinnitus.    Eyes: Negative for discharge and redness.   Respiratory: Negative for cough, shortness of breath and wheezing.    Gastrointestinal: Negative for abdominal pain, constipation, diarrhea, nausea and vomiting.   Musculoskeletal: Negative for myalgias and neck pain.   Skin: Negative for itching and rash.   Neurological: Negative for dizziness, weakness and headaches.   Endo/Heme/Allergies: Positive for environmental allergies.   All other systems reviewed and are negative.         Objective:     /80 (BP Location: Right arm, Patient Position: Sitting, BP Cuff Size: Large adult)   Pulse 70   Temp 36.5 °C (97.7 °F) (Temporal)   Resp 16   Ht 1.626 m (5' 4\")   Wt 99.8 kg (220 lb)   LMP 10/29/2012   SpO2 96%   BMI 37.76 kg/m²      Physical Exam  Vitals reviewed.   Constitutional:       General: She is awake. She is not in acute distress.     Appearance: She is well-developed. She is not ill-appearing, toxic-appearing or diaphoretic.      Comments: Appears fatigued.   HENT:      Head: Normocephalic.      Right Ear: No decreased hearing noted. Tenderness present. No laceration, drainage or " swelling. A middle ear effusion is present. There is no impacted cerumen. No foreign body. No mastoid tenderness. No PE tube. No hemotympanum. Tympanic membrane is not injected, scarred, perforated, erythematous, retracted or bulging.      Left Ear: A middle ear effusion is present.      Nose: Nose normal.   Eyes:      Extraocular Movements: Extraocular movements intact.      Conjunctiva/sclera: Conjunctivae normal.      Pupils: Pupils are equal, round, and reactive to light.   Cardiovascular:      Rate and Rhythm: Normal rate.   Pulmonary:      Effort: Pulmonary effort is normal.   Musculoskeletal:         General: Normal range of motion.      Cervical back: Normal range of motion and neck supple.   Skin:     General: Skin is warm and dry.   Neurological:      Mental Status: She is alert and oriented to person, place, and time.      GCS: GCS eye subscore is 4. GCS verbal subscore is 5. GCS motor subscore is 6.      Cranial Nerves: Cranial nerves are intact.      Sensory: Sensation is intact.      Motor: Motor function is intact.      Coordination: Coordination is intact.      Gait: Gait is intact.   Psychiatric:         Attention and Perception: Attention and perception normal.         Mood and Affect: Mood and affect normal.         Speech: Speech normal.         Behavior: Behavior normal. Behavior is cooperative.         Thought Content: Thought content normal.         Cognition and Memory: Cognition and memory normal.         Judgment: Judgment normal.                 Assessment/Plan:        1. Other acute nonsuppurative otitis media of right ear, recurrence not specified    - amoxicillin-clavulanate (AUGMENTIN) 875-125 MG Tab; Take 1 tablet by mouth 2 times a day for 7 days.  Dispense: 14 tablet; Refill: 0    2. Dizziness    - meclizine (ANTIVERT) 25 MG Tab; Take 1 tablet by mouth 3 times a day as needed for Dizziness.  Dispense: 30 tablet; Refill: 0    Increase water intake  Get rest  May use Ibuprofen/Tylenol  prn for any fever, body aches or throat pain  May take longer acting antihistamine for seasonal allergy symptoms prn  May use saline nasal spray for nasal congestion  May use Flonase or Nasocort for allergen nasal congestion  May gargle with salt water prn for throat discomfort  May drink smoothies for nutrition if too painful to swallow solid foods  Monitor for productive cough, SOB and chest pain/tightness- need re-evaluation

## 2021-06-17 DIAGNOSIS — K21.9 GERD WITHOUT ESOPHAGITIS: ICD-10-CM

## 2021-06-17 RX ORDER — FAMOTIDINE 20 MG/1
20 TABLET, FILM COATED ORAL 2 TIMES DAILY
Qty: 180 TABLET | Refills: 1 | Status: SHIPPED | OUTPATIENT
Start: 2021-06-17 | End: 2021-12-07

## 2021-06-24 ENCOUNTER — OFFICE VISIT (OUTPATIENT)
Dept: MEDICAL GROUP | Facility: MEDICAL CENTER | Age: 51
End: 2021-06-24
Payer: COMMERCIAL

## 2021-06-24 VITALS
OXYGEN SATURATION: 94 % | HEIGHT: 64 IN | RESPIRATION RATE: 16 BRPM | TEMPERATURE: 97.4 F | WEIGHT: 235 LBS | DIASTOLIC BLOOD PRESSURE: 70 MMHG | BODY MASS INDEX: 40.12 KG/M2 | SYSTOLIC BLOOD PRESSURE: 122 MMHG | HEART RATE: 70 BPM

## 2021-06-24 DIAGNOSIS — M62.830 MUSCLE SPASM OF BACK: ICD-10-CM

## 2021-06-24 DIAGNOSIS — F90.9 ATTENTION DEFICIT DISORDER OF ADULT WITH HYPERACTIVITY: ICD-10-CM

## 2021-06-24 DIAGNOSIS — F41.9 ANXIETY: ICD-10-CM

## 2021-06-24 PROCEDURE — 99213 OFFICE O/P EST LOW 20 MIN: CPT | Performed by: FAMILY MEDICINE

## 2021-06-24 RX ORDER — CYCLOBENZAPRINE HCL 5 MG
5 TABLET ORAL 3 TIMES DAILY PRN
Qty: 90 TABLET | Refills: 5 | Status: SHIPPED | OUTPATIENT
Start: 2021-06-24 | End: 2023-01-09

## 2021-06-24 RX ORDER — LORAZEPAM 0.5 MG/1
0.5 TABLET ORAL EVERY 8 HOURS PRN
Qty: 90 TABLET | Refills: 2 | Status: SHIPPED | OUTPATIENT
Start: 2021-06-24 | End: 2021-12-06

## 2021-06-24 ASSESSMENT — PATIENT HEALTH QUESTIONNAIRE - PHQ9: CLINICAL INTERPRETATION OF PHQ2 SCORE: 0

## 2021-06-24 ASSESSMENT — FIBROSIS 4 INDEX: FIB4 SCORE: 0.73

## 2021-06-24 NOTE — PROGRESS NOTES
Chief Complaint   Patient presents with   • Muscle Spasms   • Anxiety   • ADHD       Subjective:     HPI:   Aruna Smith presents today with the followin. Muscle spasm of back  States the cyclobenzaprine continues to be very helpful for her muscle spasms.  This is renewed.    2. Anxiety  The lorazepam continues to be very helpful.  PDMP review shows no inconsistencies. Discussed, no inconsistencies.      3. Attention deficit disorder of adult with hyperactivity  The bupropion continues to be very helpful.         Patient Active Problem List    Diagnosis Date Noted   • Psychophysiologic insomnia 2018   • Hypercholesterolemia with hypertriglyceridemia    • GERD without esophagitis    • Colitis, indeterminate    • Obesity, Class II, BMI 35-39.9 (CMS-[facility]) 2014   • Attention deficit disorder of adult with hyperactivity    • Family history of diabetes mellitus type II    • Seasonal allergic rhinitis    • Seasonal allergies    • Anxiety        Current medicines (including changes today)  Current Outpatient Medications   Medication Sig Dispense Refill   • cyclobenzaprine (FLEXERIL) 5 mg tablet Take 1 tablet by mouth 3 times a day as needed for Muscle Spasms. 90 tablet 5   • LORazepam (ATIVAN) 0.5 MG Tab Take 1 tablet by mouth every 8 hours as needed for up to 90 days. FOR ANXIETY 90 tablet 2   • famotidine (PEPCID) 20 MG Tab Take 1 tablet by mouth 2 times a day. TAKE ONE TABLET BY MOUTH TWICE A  tablet 1   • meclizine (ANTIVERT) 25 MG Tab Take 1 tablet by mouth 3 times a day as needed for Dizziness. 30 tablet 0   • buPROPion (WELLBUTRIN XL) 300 MG XL tablet Take 1 Tab by mouth every morning. 90 Tab 3   • sertraline (ZOLOFT) 50 MG Tab Take 1 Tab by mouth every day. 90 Tab 3   • montelukast (SINGULAIR) 10 MG Tab Take 1 Tab by mouth every day. 30 Tab 6   • fluticasone (FLONASE ALLERGY RELIEF) 50 MCG/ACT nasal spray Spray 1 Spray in nose 2 times a day. 16 g 0   • Loratadine (CLARITIN PO) Take  by  "mouth.       No current facility-administered medications for this visit.       Allergies   Allergen Reactions   • Quetiapine      Severe anger   • Xanax [Alprazolam Xr]      shaking       ROS: As per HPI       Objective:     /70   Pulse 70   Temp 36.3 °C (97.4 °F)   Resp 16   Ht 1.626 m (5' 4\")   Wt 107 kg (235 lb)   SpO2 94%  Body mass index is 40.34 kg/m².    Physical Exam:  Constitutional: Well-developed and well-nourished. Not diaphoretic. No distress. Lucid and fluent.  Patient, physician and staff all wearing masks.  Skin: Skin is warm and dry. No rash noted.  Head: Atraumatic without lesions.  Eyes: Conjunctivae and extraocular motions are normal. Pupils are equal, round, and reactive to light. No scleral icterus.   Ears:  External ears unremarkable.   Neck: Supple, trachea midline. No thyromegaly present. No cervical or supraclavicular lymphadenopathy. No JVD or carotid bruits appreciated  Cardiovascular: Regular rate and rhythm.  Normal S1, S2 without murmur appreciated.  Chest: Effort normal. Clear to auscultation throughout. No adventitious sounds.   Abdomen: Soft, non tender, and without distention. Active bowel sounds in all four quadrants. No rebound, guarding, masses or hepatosplenomegaly.  Extremities: No cyanosis, clubbing, erythema, nor edema.   Neurological: Alert and oriented x 3. No termor appreciated.  Psychiatric:  Behavior, mood, and affect are appropriate.       Assessment and Plan:     51 y.o. female with the following issues:    1. Muscle spasm of back  cyclobenzaprine (FLEXERIL) 5 mg tablet   2. Anxiety  LORazepam (ATIVAN) 0.5 MG Tab   3. Attention deficit disorder of adult with hyperactivity           Followup: Return in about 6 months (around 12/24/2021), or if symptoms worsen or fail to improve.  "

## 2021-08-21 ENCOUNTER — OFFICE VISIT (OUTPATIENT)
Dept: URGENT CARE | Facility: PHYSICIAN GROUP | Age: 51
End: 2021-08-21
Payer: COMMERCIAL

## 2021-08-21 ENCOUNTER — HOSPITAL ENCOUNTER (OUTPATIENT)
Facility: MEDICAL CENTER | Age: 51
End: 2021-08-21
Attending: PHYSICIAN ASSISTANT
Payer: COMMERCIAL

## 2021-08-21 VITALS
BODY MASS INDEX: 37.56 KG/M2 | HEART RATE: 95 BPM | WEIGHT: 220 LBS | RESPIRATION RATE: 16 BRPM | OXYGEN SATURATION: 93 % | HEIGHT: 64 IN | DIASTOLIC BLOOD PRESSURE: 90 MMHG | SYSTOLIC BLOOD PRESSURE: 120 MMHG | TEMPERATURE: 98.2 F

## 2021-08-21 DIAGNOSIS — R51.9 NONINTRACTABLE HEADACHE, UNSPECIFIED CHRONICITY PATTERN, UNSPECIFIED HEADACHE TYPE: ICD-10-CM

## 2021-08-21 DIAGNOSIS — R11.2 NAUSEA AND VOMITING, INTRACTABILITY OF VOMITING NOT SPECIFIED, UNSPECIFIED VOMITING TYPE: ICD-10-CM

## 2021-08-21 DIAGNOSIS — B34.9 NONSPECIFIC SYNDROME SUGGESTIVE OF VIRAL ILLNESS: ICD-10-CM

## 2021-08-21 PROCEDURE — 99213 OFFICE O/P EST LOW 20 MIN: CPT | Performed by: PHYSICIAN ASSISTANT

## 2021-08-21 PROCEDURE — U0003 INFECTIOUS AGENT DETECTION BY NUCLEIC ACID (DNA OR RNA); SEVERE ACUTE RESPIRATORY SYNDROME CORONAVIRUS 2 (SARS-COV-2) (CORONAVIRUS DISEASE [COVID-19]), AMPLIFIED PROBE TECHNIQUE, MAKING USE OF HIGH THROUGHPUT TECHNOLOGIES AS DESCRIBED BY CMS-2020-01-R: HCPCS

## 2021-08-21 PROCEDURE — U0005 INFEC AGEN DETEC AMPLI PROBE: HCPCS

## 2021-08-21 ASSESSMENT — ENCOUNTER SYMPTOMS
DIARRHEA: 1
MYALGIAS: 1
NAUSEA: 1
COUGH: 0
HEADACHES: 1
CHILLS: 1
CONSTIPATION: 0
ABDOMINAL PAIN: 0
EYE PAIN: 0
FEVER: 0
VOMITING: 1
SHORTNESS OF BREATH: 0
SORE THROAT: 0

## 2021-08-21 ASSESSMENT — FIBROSIS 4 INDEX: FIB4 SCORE: 0.73

## 2021-08-21 NOTE — PROGRESS NOTES
Subjective:   Aruna Smith is a 51 y.o. female who presents for Coronavirus Screening (vomiting, diarrhea, headache, congestion, body aches. 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  She felt rundown yesterday but is feeling much better today.  She requires a Covid test in order to return to work.  She has no specific sick contacts but does note many of her students are out with nonspecific illness.  She is able to tolerate liquids.  She is not having any difficulty breathing she reports    Review of Systems   Constitutional: Positive for chills and malaise/fatigue. Negative for fever.   HENT: Negative for congestion, ear pain and sore throat.    Eyes: Negative for pain.   Respiratory: Negative for cough and shortness of breath.    Cardiovascular: Negative for chest pain.   Gastrointestinal: Positive for diarrhea, nausea and vomiting. Negative for abdominal pain and constipation.   Genitourinary: Negative for dysuria.   Musculoskeletal: Positive for myalgias.   Skin: Negative for rash.   Neurological: Positive for headaches.       Medications:    • buPROPion  • CLARITIN PO  • cyclobenzaprine  • famotidine Tabs  • fluticasone  • LORazepam Tabs  • meclizine Tabs  • montelukast Tabs  • sertraline Tabs    Allergies: Quetiapine and Xanax [alprazolam xr]    Problem List: Aruna Smith does not have any pertinent problems on file.    Surgical History:  Past Surgical History:   Procedure Laterality Date   • VAGINAL HYSTERECTOMY TOTAL  10/29/2012    Performed by Michael Stewart M.D. at SURGERY SAME DAY [facility] ORS   • CYSTOSCOPY  10/29/2012    Performed by Michael Stewart M.D. at SURGERY SAME DAY [facility] ORS       Past Social Hx: Aruna Smith  reports that she quit smoking about 32 years ago. Her smoking use  "included cigarettes. She has a 2.00 pack-year smoking history. She has never used smokeless tobacco. She reports that she does not drink alcohol and does not use drugs.     Past Family Hx:  Aruna Smith family history includes Diabetes in her mother; Heart Disease in her father; Heart Disease (age of onset: 58) in her brother; Hypertension in her brother, maternal grandfather, and mother.     Problem list, medications, and allergies reviewed by myself today in Epic.     Objective:     /90 (BP Location: Left arm, Patient Position: Sitting, BP Cuff Size: Large adult)   Pulse 95   Temp 36.8 °C (98.2 °F) (Temporal)   Resp 16   Ht 1.626 m (5' 4\")   Wt 99.8 kg (220 lb)   LMP 10/29/2012   SpO2 93%   BMI 37.76 kg/m²     Physical Exam  Vitals reviewed.   Constitutional:       Appearance: Normal appearance. She is not ill-appearing or toxic-appearing.   HENT:      Head: Normocephalic and atraumatic.      Right Ear: External ear normal.      Left Ear: External ear normal.      Nose: Nose normal.      Mouth/Throat:      Mouth: Mucous membranes are moist.   Eyes:      Conjunctiva/sclera: Conjunctivae normal.   Cardiovascular:      Rate and Rhythm: Normal rate.   Pulmonary:      Effort: Pulmonary effort is normal.   Abdominal:      Tenderness: There is no abdominal tenderness. There is no right CVA tenderness or left CVA tenderness.   Skin:     General: Skin is warm and dry.      Capillary Refill: Capillary refill takes less than 2 seconds.   Neurological:      Mental Status: She is alert and oriented to person, place, and time.         Assessment/Plan:     Diagnosis and associated orders:     1. Nonintractable headache, unspecified chronicity pattern, unspecified headache type  COVID/SARS CoV-2 PCR   2. Nausea and vomiting, intractability of vomiting not specified, unspecified vomiting type  COVID/SARS CoV-2 PCR   3. Nonspecific syndrome suggestive of viral illness  COVID/SARS CoV-2 PCR      Comments/MDM: "     Patient's vital signs are reassuring and they appear hemodynamically stable and do not require higher level care at this time  I discussed self isolation and provided printed instructions (if applicable)  I discussed ER precautions and provided printed instructions (if applicable)  I educated the patient on possibility of a false-negative test and indications for repeat testing  I instructed the patient to try to follow up with their PCP (if applicable) for follow up care  I provided the patient the printed AVS which contains information about signing up for [facility]   I will contact the patient via [facility] with Covid results.  If requested, I provided the patient with a work note to provide to their employer or school regarding returning to work and discontinuation of self isolation.  All questions were answered and patient demonstrated verbal understanding of above.  I followed all reasonable PPE precautions during this encounter including but not limited to use of an N95 mask, gloves, and gown if indicated.             Differential diagnosis, natural history, supportive care, and indications for immediate follow-up discussed.    Advised the patient to follow-up with the primary care physician for recheck, reevaluation, and consideration of further management.    Please note that this dictation was created using voice recognition software. I have made a reasonable attempt to correct obvious errors, but I expect that there are errors of grammar and possibly content that I did not discover before finalizing the note.    This note was electronically signed by Anastacio Hayes PA-C

## 2021-08-22 DIAGNOSIS — R11.2 NAUSEA AND VOMITING, INTRACTABILITY OF VOMITING NOT SPECIFIED, UNSPECIFIED VOMITING TYPE: ICD-10-CM

## 2021-08-22 DIAGNOSIS — R51.9 NONINTRACTABLE HEADACHE, UNSPECIFIED CHRONICITY PATTERN, UNSPECIFIED HEADACHE TYPE: ICD-10-CM

## 2021-08-22 DIAGNOSIS — B34.9 NONSPECIFIC SYNDROME SUGGESTIVE OF VIRAL ILLNESS: ICD-10-CM

## 2021-08-22 LAB — COVID ORDER STATUS COVID19: NORMAL

## 2021-08-23 LAB
SARS-COV-2 RNA RESP QL NAA+PROBE: NOTDETECTED
SPECIMEN SOURCE: NORMAL

## 2021-10-04 ENCOUNTER — HOSPITAL ENCOUNTER (OUTPATIENT)
Facility: MEDICAL CENTER | Age: 51
End: 2021-10-04
Attending: PHYSICIAN ASSISTANT
Payer: COMMERCIAL

## 2021-10-04 ENCOUNTER — OFFICE VISIT (OUTPATIENT)
Dept: URGENT CARE | Facility: CLINIC | Age: 51
End: 2021-10-04
Payer: COMMERCIAL

## 2021-10-04 VITALS
BODY MASS INDEX: 40.12 KG/M2 | OXYGEN SATURATION: 98 % | HEART RATE: 76 BPM | RESPIRATION RATE: 16 BRPM | SYSTOLIC BLOOD PRESSURE: 118 MMHG | TEMPERATURE: 98.7 F | HEIGHT: 64 IN | WEIGHT: 235 LBS | DIASTOLIC BLOOD PRESSURE: 86 MMHG

## 2021-10-04 DIAGNOSIS — J98.01 BRONCHOSPASM: ICD-10-CM

## 2021-10-04 DIAGNOSIS — R05.9 COUGH: ICD-10-CM

## 2021-10-04 LAB
EXTERNAL QUALITY CONTROL: NEGATIVE
SARS-COV+SARS-COV-2 AG RESP QL IA.RAPID: NORMAL

## 2021-10-04 PROCEDURE — 99213 OFFICE O/P EST LOW 20 MIN: CPT | Performed by: PHYSICIAN ASSISTANT

## 2021-10-04 PROCEDURE — 87426 SARSCOV CORONAVIRUS AG IA: CPT | Performed by: PHYSICIAN ASSISTANT

## 2021-10-04 PROCEDURE — U0003 INFECTIOUS AGENT DETECTION BY NUCLEIC ACID (DNA OR RNA); SEVERE ACUTE RESPIRATORY SYNDROME CORONAVIRUS 2 (SARS-COV-2) (CORONAVIRUS DISEASE [COVID-19]), AMPLIFIED PROBE TECHNIQUE, MAKING USE OF HIGH THROUGHPUT TECHNOLOGIES AS DESCRIBED BY CMS-2020-01-R: HCPCS

## 2021-10-04 PROCEDURE — U0005 INFEC AGEN DETEC AMPLI PROBE: HCPCS

## 2021-10-04 RX ORDER — ALBUTEROL SULFATE 90 UG/1
2 AEROSOL, METERED RESPIRATORY (INHALATION) EVERY 6 HOURS PRN
Qty: 8.5 G | Refills: 0 | Status: SHIPPED | OUTPATIENT
Start: 2021-10-04 | End: 2022-09-09 | Stop reason: SDUPTHER

## 2021-10-04 ASSESSMENT — ENCOUNTER SYMPTOMS
MYALGIAS: 0
HEADACHES: 0
SHORTNESS OF BREATH: 0
CONSTIPATION: 0
CHILLS: 0
FEVER: 0
NAUSEA: 0
ABDOMINAL PAIN: 0
DIARRHEA: 0
EYE PAIN: 0
COUGH: 1
SORE THROAT: 0
VOMITING: 0

## 2021-10-04 ASSESSMENT — FIBROSIS 4 INDEX: FIB4 SCORE: 0.73

## 2021-10-04 NOTE — PROGRESS NOTES
Subjective:   Aruna Smith is a 51 y.o. 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  She denies any difficulty breathing.    Review of Systems   Constitutional: Positive for malaise/fatigue. Negative for chills and fever.   HENT: Positive for congestion. Negative for ear pain and sore throat.    Eyes: Negative for pain.   Respiratory: Positive for cough. Negative for shortness of breath.    Cardiovascular: Negative for chest pain.   Gastrointestinal: Negative for abdominal pain, constipation, diarrhea, nausea and vomiting.   Genitourinary: Negative for dysuria.   Musculoskeletal: Negative for myalgias.   Skin: Negative for rash.   Neurological: Negative for headaches.       Medications:    • albuterol Aers  • buPROPion  • CLARITIN PO  • cyclobenzaprine  • famotidine Tabs  • fluticasone  • meclizine Tabs  • montelukast Tabs  • sertraline Tabs    Allergies: Quetiapine and Xanax [alprazolam xr]    Problem List: Aruna Smith does not have any pertinent problems on file.    Surgical History:  Past Surgical History:   Procedure Laterality Date   • VAGINAL HYSTERECTOMY TOTAL  10/29/2012    Performed by Michael Stewart M.D. at SURGERY SAME DAY [facility]   • CYSTOSCOPY  10/29/2012    Performed by Michael Stewart M.D. at SURGERY SAME DAY [facility]       Past Social Hx: Aruna Smith  reports that she quit smoking about 33 years ago. Her smoking use included cigarettes. She has a 2.00 pack-year smoking history. She has never used smokeless tobacco. She reports that she does not drink alcohol and does not use drugs.     Past Family Hx:  Aruna Smith family history includes Diabetes in her mother; Heart Disease in her father; Heart Disease (age of onset: 58) in her brother; Hypertension in her brother, maternal grandfather, and mother.  "    Problem list, medications, and allergies reviewed by myself today in Epic.     Objective:     /86   Pulse 76   Temp 37.1 °C (98.7 °F) (Temporal)   Resp 16   Ht 1.626 m (5' 4\")   Wt 107 kg (235 lb)   LMP 10/29/2012   SpO2 98%   BMI 40.34 kg/m²     Physical Exam  Vitals reviewed.   Constitutional:       Appearance: Normal appearance.   HENT:      Head: Normocephalic and atraumatic.      Right Ear: External ear normal.      Left Ear: External ear normal.      Nose: Nose normal.      Mouth/Throat:      Mouth: Mucous membranes are moist.   Eyes:      Conjunctiva/sclera: Conjunctivae normal.   Cardiovascular:      Rate and Rhythm: Normal rate and regular rhythm.   Pulmonary:      Effort: Pulmonary effort is normal.      Breath sounds: Wheezing (Trace end expiratory wheeze) present.   Skin:     General: Skin is warm and dry.      Capillary Refill: Capillary refill takes less than 2 seconds.   Neurological:      Mental Status: She is alert and oriented to person, place, and time.         Assessment/Plan:     Diagnosis and associated orders:     1. Bronchospasm  albuterol 108 (90 Base) MCG/ACT Aero Soln inhalation aerosol   2. Cough  POCT SARS-COV Antigen [place] (Symptomatic Only)    COVID/SARS CoV-2 PCR      Comments/MDM:     • Rapid Covid negative  • Trial of albuterol due to the expiratory wheeze.  The patient's phenotype with her history of atypia would highly suggest some element of bronchospasm being part of her illness.  No signs of hypoxia or severe illness at this time  Patient's vital signs are reassuring and they appear hemodynamically stable and do not require higher level care at this time  I discussed self isolation and provided printed instructions (if applicable)  I discussed ER precautions and provided printed instructions (if applicable)  I educated the patient on possibility of a false-negative test and indications for repeat testing  I instructed the patient to try to follow up with their " "PCP (if applicable) for follow up care  I discussed the possibly of \"breakthrough infections\" in fully vaccinated people  The patient will receive results via MyChart (\"detected\" is a positive result, \"not detected\" indicates a negative result)  If requested, I provided the patient with a work note to provide to their employer or school regarding returning to work and discontinuation of self isolation.  All questions were answered and patient demonstrated verbal understanding of above.  I followed all reasonable PPE precautions during this encounter including but not limited to use of an N95 mask, gloves, and gown if indicated.             Differential diagnosis, natural history, supportive care, and indications for immediate follow-up discussed.    Advised the patient to follow-up with the primary care physician for recheck, reevaluation, and consideration of further management.    Please note that this dictation was created using voice recognition software. I have made a reasonable attempt to correct obvious errors, but I expect that there are errors of grammar and possibly content that I did not discover before finalizing the note.    This note was electronically signed by Anastacio Hayes PA-C  "

## 2021-10-05 DIAGNOSIS — R05.9 COUGH: ICD-10-CM

## 2021-10-05 LAB — COVID ORDER STATUS COVID19: NORMAL

## 2021-10-06 LAB
SARS-COV-2 RNA RESP QL NAA+PROBE: NOTDETECTED
SPECIMEN SOURCE: NORMAL

## 2021-11-27 DIAGNOSIS — F90.9 ATTENTION DEFICIT DISORDER OF ADULT WITH HYPERACTIVITY: ICD-10-CM

## 2021-12-24 DIAGNOSIS — F90.9 ATTENTION DEFICIT DISORDER OF ADULT WITH HYPERACTIVITY: ICD-10-CM

## 2021-12-27 RX ORDER — BUPROPION HYDROCHLORIDE 300 MG/1
TABLET ORAL
Qty: 90 TABLET | Refills: 1 | Status: SHIPPED | OUTPATIENT
Start: 2021-12-27 | End: 2022-06-23

## 2021-12-29 ENCOUNTER — APPOINTMENT (OUTPATIENT)
Dept: MEDICAL GROUP | Facility: MEDICAL CENTER | Age: 51
End: 2021-12-29
Payer: COMMERCIAL

## 2022-01-17 ENCOUNTER — OFFICE VISIT (OUTPATIENT)
Dept: MEDICAL GROUP | Facility: MEDICAL CENTER | Age: 52
End: 2022-01-17
Payer: COMMERCIAL

## 2022-01-17 VITALS
OXYGEN SATURATION: 96 % | BODY MASS INDEX: 39.09 KG/M2 | HEART RATE: 90 BPM | RESPIRATION RATE: 16 BRPM | SYSTOLIC BLOOD PRESSURE: 124 MMHG | DIASTOLIC BLOOD PRESSURE: 72 MMHG | WEIGHT: 229 LBS | TEMPERATURE: 98.7 F | HEIGHT: 64 IN

## 2022-01-17 DIAGNOSIS — F41.9 ANXIETY: ICD-10-CM

## 2022-01-17 DIAGNOSIS — Z12.39 SCREENING BREAST EXAMINATION: ICD-10-CM

## 2022-01-17 DIAGNOSIS — F51.04 PSYCHOPHYSIOLOGIC INSOMNIA: ICD-10-CM

## 2022-01-17 PROCEDURE — 99213 OFFICE O/P EST LOW 20 MIN: CPT | Performed by: FAMILY MEDICINE

## 2022-01-17 RX ORDER — LORAZEPAM 0.5 MG/1
0.5 TABLET ORAL EVERY 8 HOURS PRN
Qty: 90 TABLET | Refills: 5 | Status: SHIPPED | OUTPATIENT
Start: 2022-01-17 | End: 2022-09-09 | Stop reason: SDUPTHER

## 2022-01-17 ASSESSMENT — PATIENT HEALTH QUESTIONNAIRE - PHQ9: CLINICAL INTERPRETATION OF PHQ2 SCORE: 0

## 2022-01-17 ASSESSMENT — FIBROSIS 4 INDEX: FIB4 SCORE: 0.73

## 2022-01-17 NOTE — PROGRESS NOTES
Chief Complaint   Patient presents with   • Anxiety   • Insomnia   • Medication Follow-up       Subjective:     HPI:   Aruna Smith presents today with the followin. Screening breast examination  Mammogram order discussed and placed    2. Anxiety  Patient uses the lorazepam anxiety medication typically twice daily.  She will occasionally have to use a third 1 at nighttime.  PDMP review shows no inconsistencies.  She feels this is helpful and allows her to function at work.  Denies somnolence.  Denies any current depression.    3. Psychophysiologic insomnia  Patient does use lorazepam at night when she is too anxious to sleep but generally she does not use this nightly.  She states it is extremely helpful when she does need it.    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      Patient Active Problem List    Diagnosis Date Noted   • Psychophysiologic insomnia 2018   • Hypercholesterolemia with hypertriglyceridemia    • GERD without esophagitis    • Colitis, indeterminate    • Obesity, Class II, BMI 35-39.9 (CMS-[facility]) 2014   • Attention deficit disorder of adult with hyperactivity    • Family history of diabetes mellitus type II    • Seasonal allergic rhinitis    • Seasonal allergies    • Anxiety        Current medicines (including changes today)  Current Outpatient Medications   Medication Sig Dispense Refill   • LORazepam (ATIVAN) 0.5 MG Tab Take 1 Tablet by mouth every 8 hours as needed for Anxiety for up to 180 days. 90 tablets is a 30 day quantity 90 Tablet 5   • buPROPion (WELLBUTRIN XL) 300 MG XL tablet TAKE ONE TABLET BY MOUTH EVERY MORNING 90 Tablet 1   • famotidine (PEPCID) 20 MG Tab TAKE ONE TABLET BY MOUTH TWICE A  Tablet 1   • sertraline (ZOLOFT) 50 MG Tab TAKE ONE TABLET BY MOUTH DAILY 90 Tablet 3   • albuterol 108 (90 Base) MCG/ACT Aero Soln inhalation aerosol Inhale 2 Puffs every 6 hours as needed for Shortness of Breath.  "8.5 g 0   • cyclobenzaprine (FLEXERIL) 5 mg tablet Take 1 tablet by mouth 3 times a day as needed for Muscle Spasms. 90 tablet 5   • meclizine (ANTIVERT) 25 MG Tab Take 1 tablet by mouth 3 times a day as needed for Dizziness. 30 tablet 0   • montelukast (SINGULAIR) 10 MG Tab Take 1 Tab by mouth every day. 30 Tab 6   • fluticasone (FLONASE ALLERGY RELIEF) 50 MCG/ACT nasal spray Spray 1 Spray in nose 2 times a day. 16 g 0   • Loratadine (CLARITIN PO) Take  by mouth.       No current facility-administered medications for this visit.       Allergies   Allergen Reactions   • Quetiapine      Severe anger   • Xanax [Alprazolam Xr]      shaking       ROS: As per HPI       Objective:     /72 (BP Location: Left arm)   Pulse 90   Temp 37.1 °C (98.7 °F)   Resp 16   Ht 1.626 m (5' 4\")   Wt 104 kg (229 lb)   SpO2 96%  Body mass index is 39.31 kg/m².    Physical Exam:  Constitutional: Well-developed and well-nourished. Not diaphoretic. No distress. Lucid and fluent.  Patient, physician and staff all wearing masks.  Skin: Skin is warm and dry. No rash noted.  Head: Atraumatic without lesions.  Eyes: Conjunctivae and extraocular motions are normal. Pupils are equal, round, and reactive to light. No scleral icterus.   Ears:  External ears unremarkable.  Neck: Supple, trachea midline. No thyromegaly present. No cervical or supraclavicular lymphadenopathy. No JVD or carotid bruits appreciated  Cardiovascular: Regular rate and rhythm.  Normal S1, S2 without murmur appreciated.  Chest: Effort normal. Clear to auscultation throughout. No adventitious sounds.   Extremities: No cyanosis, clubbing, erythema, nor edema.   Neurological: Alert and oriented x 3.   Psychiatric:  Behavior, mood, and affect are appropriate.       Assessment and Plan:     51 y.o. female with the following issues:    1. Screening breast examination  MA-SCREENING MAMMO BILAT W/TOMOSYNTHESIS W/CAD   2. Anxiety  LORazepam (ATIVAN) 0.5 MG Tab   3. " Psychophysiologic insomnia           Followup: Return in about 6 months (around 7/17/2022), or if symptoms worsen or fail to improve.

## 2022-06-03 DIAGNOSIS — K21.9 GERD WITHOUT ESOPHAGITIS: ICD-10-CM

## 2022-06-03 RX ORDER — FAMOTIDINE 20 MG/1
TABLET, FILM COATED ORAL
Qty: 180 TABLET | Refills: 1 | Status: SHIPPED | OUTPATIENT
Start: 2022-06-03 | End: 2022-11-28

## 2022-06-22 DIAGNOSIS — F90.9 ATTENTION DEFICIT DISORDER OF ADULT WITH HYPERACTIVITY: ICD-10-CM

## 2022-06-23 RX ORDER — BUPROPION HYDROCHLORIDE 300 MG/1
TABLET ORAL
Qty: 90 TABLET | Refills: 1 | Status: SHIPPED | OUTPATIENT
Start: 2022-06-23 | End: 2022-12-19 | Stop reason: SDUPTHER

## 2022-08-19 ENCOUNTER — APPOINTMENT (OUTPATIENT)
Dept: MEDICAL GROUP | Facility: MEDICAL CENTER | Age: 52
End: 2022-08-19
Payer: COMMERCIAL

## 2022-09-09 ENCOUNTER — OFFICE VISIT (OUTPATIENT)
Dept: MEDICAL GROUP | Facility: MEDICAL CENTER | Age: 52
End: 2022-09-09
Payer: COMMERCIAL

## 2022-09-09 VITALS
WEIGHT: 235.45 LBS | HEIGHT: 64 IN | HEART RATE: 86 BPM | OXYGEN SATURATION: 94 % | RESPIRATION RATE: 14 BRPM | TEMPERATURE: 96.4 F | SYSTOLIC BLOOD PRESSURE: 112 MMHG | DIASTOLIC BLOOD PRESSURE: 66 MMHG | BODY MASS INDEX: 40.2 KG/M2

## 2022-09-09 DIAGNOSIS — K21.9 GERD WITHOUT ESOPHAGITIS: ICD-10-CM

## 2022-09-09 DIAGNOSIS — J98.01 BRONCHOSPASM: ICD-10-CM

## 2022-09-09 DIAGNOSIS — E78.2 HYPERCHOLESTEROLEMIA WITH HYPERTRIGLYCERIDEMIA: ICD-10-CM

## 2022-09-09 DIAGNOSIS — R73.01 ELEVATED FASTING GLUCOSE: ICD-10-CM

## 2022-09-09 DIAGNOSIS — Z83.3 FAMILY HISTORY OF DIABETES MELLITUS TYPE II: ICD-10-CM

## 2022-09-09 DIAGNOSIS — E66.01 MORBID OBESITY WITH BMI OF 40.0-44.9, ADULT (HCC): ICD-10-CM

## 2022-09-09 DIAGNOSIS — F41.9 ANXIETY: ICD-10-CM

## 2022-09-09 DIAGNOSIS — Z12.39 SCREENING BREAST EXAMINATION: ICD-10-CM

## 2022-09-09 DIAGNOSIS — F51.04 PSYCHOPHYSIOLOGIC INSOMNIA: ICD-10-CM

## 2022-09-09 PROCEDURE — 99214 OFFICE O/P EST MOD 30 MIN: CPT | Performed by: FAMILY MEDICINE

## 2022-09-09 RX ORDER — LORAZEPAM 0.5 MG/1
0.5 TABLET ORAL EVERY 8 HOURS PRN
Qty: 90 TABLET | Refills: 5 | Status: SHIPPED | OUTPATIENT
Start: 2022-09-09 | End: 2023-03-01 | Stop reason: SDUPTHER

## 2022-09-09 RX ORDER — ALBUTEROL SULFATE 90 UG/1
2 AEROSOL, METERED RESPIRATORY (INHALATION) EVERY 6 HOURS PRN
Qty: 8.5 G | Refills: 2 | Status: SHIPPED | OUTPATIENT
Start: 2022-09-09
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
## 2022-10-26 ENCOUNTER — APPOINTMENT (OUTPATIENT)
Dept: RADIOLOGY | Facility: MEDICAL CENTER | Age: 52
End: 2022-10-26
Attending: FAMILY MEDICINE
Payer: COMMERCIAL

## 2022-10-26 DIAGNOSIS — Z12.39 SCREENING BREAST EXAMINATION: ICD-10-CM

## 2022-10-26 PROCEDURE — 77067 SCR MAMMO BI INCL CAD: CPT

## 2022-11-20 DIAGNOSIS — F90.9 ATTENTION DEFICIT DISORDER OF ADULT WITH HYPERACTIVITY: ICD-10-CM

## 2022-11-27 DIAGNOSIS — K21.9 GERD WITHOUT ESOPHAGITIS: ICD-10-CM

## 2022-11-28 RX ORDER — FAMOTIDINE 20 MG/1
TABLET, FILM COATED ORAL
Qty: 180 TABLET | Refills: 1 | Status: SHIPPED | OUTPATIENT
Start: 2022-11-28 | End: 2023-05-25

## 2022-12-19 DIAGNOSIS — F90.9 ATTENTION DEFICIT DISORDER OF ADULT WITH HYPERACTIVITY: ICD-10-CM

## 2022-12-19 RX ORDER — BUPROPION HYDROCHLORIDE 300 MG/1
300 TABLET ORAL EVERY MORNING
Qty: 90 TABLET | Refills: 1 | Status: SHIPPED | OUTPATIENT
Start: 2022-12-19 | End: 2023-06-14

## 2023-01-09 DIAGNOSIS — M62.830 MUSCLE SPASM OF BACK: ICD-10-CM

## 2023-01-09 RX ORDER — CYCLOBENZAPRINE HCL 5 MG
TABLET ORAL
Qty: 90 TABLET | Refills: 5 | Status: SHIPPED | OUTPATIENT
Start: 2023-01-09

## 2023-03-01 ENCOUNTER — OFFICE VISIT (OUTPATIENT)
Dept: MEDICAL GROUP | Facility: MEDICAL CENTER | Age: 53
End: 2023-03-01
Payer: COMMERCIAL

## 2023-03-01 VITALS
SYSTOLIC BLOOD PRESSURE: 124 MMHG | WEIGHT: 230.38 LBS | HEIGHT: 64 IN | BODY MASS INDEX: 39.33 KG/M2 | TEMPERATURE: 96.7 F | HEART RATE: 105 BPM | OXYGEN SATURATION: 92 % | DIASTOLIC BLOOD PRESSURE: 76 MMHG

## 2023-03-01 DIAGNOSIS — H43.391 FLOATERS IN VISUAL FIELD, RIGHT: ICD-10-CM

## 2023-03-01 DIAGNOSIS — F41.9 ANXIETY: ICD-10-CM

## 2023-03-01 DIAGNOSIS — F51.04 PSYCHOPHYSIOLOGIC INSOMNIA: ICD-10-CM

## 2023-03-01 DIAGNOSIS — E66.9 OBESITY, CLASS II, BMI 35-39.9: ICD-10-CM

## 2023-03-01 DIAGNOSIS — H53.8 FLASHING LIGHTS: ICD-10-CM

## 2023-03-01 PROBLEM — E66.01 MORBID OBESITY WITH BMI OF 40.0-44.9, ADULT (HCC): Status: RESOLVED | Noted: 2022-09-09 | Resolved: 2023-03-01

## 2023-03-01 PROCEDURE — 99213 OFFICE O/P EST LOW 20 MIN: CPT | Performed by: FAMILY MEDICINE

## 2023-03-01 RX ORDER — LORAZEPAM 0.5 MG/1
0.5 TABLET ORAL EVERY 8 HOURS PRN
Qty: 90 TABLET | Refills: 5 | Status: SHIPPED | OUTPATIENT
Start: 2023-03-01 | End: 2023-08-28

## 2023-03-01 ASSESSMENT — PATIENT HEALTH QUESTIONNAIRE - PHQ9: CLINICAL INTERPRETATION OF PHQ2 SCORE: 0

## 2023-03-02 NOTE — PROGRESS NOTES
Chief Complaint   Patient presents with    Eye Problem     Pt reports vision problems x 5 days     Other     Bronchospasms 6 mth fv       Subjective:     HPI:   Aruna Smith presents today with the followin. Flashing lights/Floaters in visual field, right  When she woke up this saturday she had floaters and flashing lights in the right eye visual field.  Somewhat peripheral.  Ophthalmology referral discussed and placed.  Have marked urgent.    2. Anxiety/Psychophysiologic insomnia  Patient has chronic anxiety and related insomnia.  She has used lorazepam to treat this for many years.  Typically patient is not taking the medication every day though some weeks are harder than others.  PDMP review shows no inconsistencies.  Patient does not drink alcohol.  No adverse events have been reported or observed.  The medication is renewed.    3. Obesity, Class II, BMI 35-39.9 (CMS-HCC)  Patient has lost another 6 pounds.  She has been working hard on being active and having a good diet.  She is congratulated.    When allergies get bad she has bronchospasm.  She has a current inhaler.      Patient Active Problem List    Diagnosis Date Noted    Psychophysiologic insomnia 2018    Hypercholesterolemia with hypertriglyceridemia     GERD without esophagitis     Colitis, indeterminate     Obesity, Class II, BMI 35-39.9 (CMS-HCC) 2014    Attention deficit disorder of adult with hyperactivity     Family history of diabetes mellitus type II     Seasonal allergic rhinitis     Seasonal allergies     Anxiety        Current medicines (including changes today)  Current Outpatient Medications   Medication Sig Dispense Refill    LORazepam (ATIVAN) 0.5 MG Tab Take 1 Tablet by mouth every 8 hours as needed for Anxiety for up to 180 days. 90 tablets is a 30 day quantity 90 Tablet 5    cyclobenzaprine (FLEXERIL) 5 mg tablet TAKE ONE TABLET BY MOUTH THREE TIMES A DAY AS NEEDED FOR MUSCLE SPASMS 90 Tablet 5    buPROPion  "(WELLBUTRIN XL) 300 MG XL tablet Take 1 Tablet by mouth every morning. 90 Tablet 1    famotidine (PEPCID) 20 MG Tab TAKE ONE TABLET BY MOUTH TWICE A  Tablet 1    sertraline (ZOLOFT) 50 MG Tab TAKE ONE TABLET BY MOUTH DAILY 90 Tablet 3    albuterol 108 (90 Base) MCG/ACT Aero Soln inhalation aerosol Inhale 2 Puffs every 6 hours as needed for Shortness of Breath. 8.5 g 2    meclizine (ANTIVERT) 25 MG Tab Take 1 tablet by mouth 3 times a day as needed for Dizziness. 30 tablet 0    montelukast (SINGULAIR) 10 MG Tab Take 1 Tab by mouth every day. 30 Tab 6    fluticasone (FLONASE ALLERGY RELIEF) 50 MCG/ACT nasal spray Spray 1 Spray in nose 2 times a day. 16 g 0    Loratadine (CLARITIN PO) Take  by mouth.       No current facility-administered medications for this visit.       Allergies   Allergen Reactions    Quetiapine      Severe anger    Xanax [Alprazolam Xr]      shaking       ROS: As per HPI       Objective:     /76 (BP Location: Left arm, Patient Position: Sitting, BP Cuff Size: Adult)   Pulse (!) 105   Temp 35.9 °C (96.7 °F) (Temporal)   Ht 1.626 m (5' 4\")   Wt 104 kg (230 lb 6.1 oz)   SpO2 92%  Body mass index is 39.54 kg/m².    Physical Exam:  Constitutional: Well-developed and well-nourished. Not diaphoretic. No distress. Lucid and fluent.  Patient, physician and staff all wearing masks.  Skin: Skin is warm and dry. No rash noted.  Head: Atraumatic without lesions.  Eyes: Conjunctivae and extraocular motions are normal. Pupils are equal, round, and reactive to light. No scleral icterus.   Ears:  External ears unremarkable.   Neck: Supple, trachea midline. No thyromegaly present. No cervical or supraclavicular lymphadenopathy. No JVD or carotid bruits appreciated  Cardiovascular: Regular rate and rhythm.  Normal S1, S2 without murmur appreciated.  Chest: Effort normal. Clear to auscultation throughout. No adventitious sounds.   Extremities: No cyanosis, clubbing, erythema, nor edema. "   Neurological: Alert and oriented x 3.   Psychiatric:  Behavior, mood, and affect are appropriate.       Assessment and Plan:     52 y.o. female with the following issues:    1. Flashing lights  Referral to Ophthalmology      2. Floaters in visual field, right  Referral to Ophthalmology      3. Anxiety  LORazepam (ATIVAN) 0.5 MG Tab      4. Psychophysiologic insomnia  LORazepam (ATIVAN) 0.5 MG Tab      5. Obesity, Class II, BMI 35-39.9 (CMS-[facility])  Patient identified as having weight management issue.  Appropriate orders and counseling given.            Followup: Return in about 6 months (around 9/1/2023), or if symptoms worsen or fail to improve.

## 2023-03-03 ENCOUNTER — HOSPITAL ENCOUNTER (EMERGENCY)
Facility: MEDICAL CENTER | Age: 53
End: 2023-03-03
Attending: EMERGENCY MEDICINE
Payer: COMMERCIAL

## 2023-03-03 VITALS
WEIGHT: 230.16 LBS | HEIGHT: 64 IN | SYSTOLIC BLOOD PRESSURE: 143 MMHG | DIASTOLIC BLOOD PRESSURE: 99 MMHG | RESPIRATION RATE: 18 BRPM | TEMPERATURE: 98.4 F | OXYGEN SATURATION: 96 % | BODY MASS INDEX: 39.29 KG/M2 | HEART RATE: 82 BPM

## 2023-03-03 DIAGNOSIS — H53.9 VISUAL CHANGES: ICD-10-CM

## 2023-03-03 PROCEDURE — 99283 EMERGENCY DEPT VISIT LOW MDM: CPT

## 2023-03-03 PROCEDURE — 700101 HCHG RX REV CODE 250: Performed by: EMERGENCY MEDICINE

## 2023-03-03 RX ORDER — TETRACAINE HYDROCHLORIDE 5 MG/ML
1 SOLUTION OPHTHALMIC ONCE
Status: COMPLETED | OUTPATIENT
Start: 2023-03-03 | End: 2023-03-03

## 2023-03-03 RX ADMIN — TETRACAINE HYDROCHLORIDE 1 DROP: 5 SOLUTION OPHTHALMIC at 06:30

## 2023-03-03 RX ADMIN — FLUORESCEIN SODIUM 1 MG: 1 STRIP OPHTHALMIC at 06:30

## 2023-03-03 NOTE — ED PROVIDER NOTES
"ED Provider Note    CHIEF COMPLAINT  Chief Complaint   Patient presents with    Blurred Vision     Last Friday pt noticed flashing lights in her R eye, pt reports there is \"floaters\" normally baseline, however, today larger floaters present on the right eye. Pt called her eye MD, and pt was sent in the ER by the Eye doctor concerned for a rectal tear. Denies double vision, reports blurry vision on the right eye. GCS 15     EXTERNAL RECORDS REVIEWED  Review of records shows the patient was seen two days ago by her PCP for floaters and flashing lights. She had a referral to opthalmology placed. Patient has a history of anxiety but is otherwise healthy.    HPI/ROS  LIMITATION TO HISTORY   Select: : None  OUTSIDE HISTORIAN(S):  none    Aruna Smith is a 52 y.o. female who presents to the Emergency Department with blurred vision onset 1 week ago. She describes her symptoms started with large floaters in her right eye and thought there was something in her eye causing the vision changes. Since then she states the objects have been floating across her vision. Denies any loss of vision. Her vision improves if she keeps her eyes still. Patient has occasional aching in her eyes, but denies significant pain. Patient wears glasses daily, but does not currently wear contacts. She was seen by her PCP a few days ago for her symptoms and was given a referral to ophthalmology however they were unable to schedule her an appointment.    PAST MEDICAL HISTORY  Past Medical History:   Diagnosis Date    Allergic rhinitis     Anxiety     Attention deficit disorder of adult with hyperactivity     Colitis, indeterminate     Family history of diabetes mellitus type II     GERD without esophagitis     Hypercholesterolemia with hypertriglyceridemia     Other specified symptom associated with female genital organs     Seasonal allergies     Shingles 5/2014        SURGICAL HISTORY  Past Surgical History:   Procedure Laterality Date    VAGINAL " "HYSTERECTOMY TOTAL  10/29/2012    Performed by Michael Stewart M.D. at SURGERY SAME DAY [facility]    CYSTOSCOPY  10/29/2012    Performed by Michael Stewart M.D. at SURGERY SAME DAY [facility]        FAMILY HISTORY  Family History   Problem Relation Age of Onset    Diabetes Mother     Hypertension Mother     Heart Disease Father         mitral valve replacement, rheumatic fever/congestive heart failure    Hypertension Brother     Heart Disease Brother 58         sudden MI    Hypertension Maternal Grandfather        SOCIAL HISTORY   reports that she quit smoking about 34 years ago. Her smoking use included cigarettes. She has a 2.00 pack-year smoking history. She has never used smokeless tobacco. She reports that she does not drink alcohol and does not use drugs.    CURRENT MEDICATIONS  Previous Medications    ALBUTEROL 108 (90 BASE) MCG/ACT AERO SOLN INHALATION AEROSOL    Inhale 2 Puffs every 6 hours as needed for Shortness of Breath.    BUPROPION (WELLBUTRIN XL) 300 MG XL TABLET    Take 1 Tablet by mouth every morning.    CYCLOBENZAPRINE (FLEXERIL) 5 MG TABLET    TAKE ONE TABLET BY MOUTH THREE TIMES A DAY AS NEEDED FOR MUSCLE SPASMS    FAMOTIDINE (PEPCID) 20 MG TAB    TAKE ONE TABLET BY MOUTH TWICE A DAY    FLUTICASONE (FLONASE ALLERGY RELIEF) 50 MCG/ACT NASAL SPRAY    Spray 1 Spray in nose 2 times a day.    LORATADINE (CLARITIN PO)    Take  by mouth.    LORAZEPAM (ATIVAN) 0.5 MG TAB    Take 1 Tablet by mouth every 8 hours as needed for Anxiety for up to 180 days. 90 tablets is a 30 day quantity    MECLIZINE (ANTIVERT) 25 MG TAB    Take 1 tablet by mouth 3 times a day as needed for Dizziness.    MONTELUKAST (SINGULAIR) 10 MG TAB    Take 1 Tab by mouth every day.    SERTRALINE (ZOLOFT) 50 MG TAB    TAKE ONE TABLET BY MOUTH DAILY       ALLERGIES  Quetiapine and Xanax [alprazolam xr]    PHYSICAL EXAM  BP (!) 163/109   Pulse 77   Temp 36.9 °C (98.4 °F) (Temporal)   Resp 18   Ht 1.626 m (5' 4\")   Wt " 104 kg (230 lb 2.6 oz)   SpO2 96%    Constitutional: Nontoxic appearing. Alert in no apparent distress.  HENT: Normocephalic, Atraumatic. Bilateral external ears normal. Nose normal.  Moist mucous membranes.  Oropharynx clear.  Eyes: Pupils are equal and reactive. Conjunctiva normal. Extraocular eye movements intact without pain.  Visual acuity as documented in the chart by nursing.  Pressures OD 15 OS 14.  Bedside US showed possible vitreous hemorrhage in posterior chambers, no obvious retinal detachment  Neck: Supple, full range of motion  Musculoskeletal: Atraumatic. No obvious deformities noted.  No lower extremity edema.  Skin: Warm, Dry.  No erythema, No rash.   Neurologic: Alert and oriented x3. Moving all extremities spontaneously without focal deficits.  Psychiatric: Affect normal, Mood normal, Appears appropriate and not intoxicated.      COURSE & MEDICAL DECISION MAKING  6:16 AM - Patient seen and examined at bedside. Bedside US of the eye was preformed. There is no obvious detachment however there is a possible vitrious hemmorage in the posterior chamber.Discussed plan of care, including consult with ophthalmology to establish a follow up appointment for her. Patient agrees to the plan of care.     ED Observation Status? No; Patient does not meet criteria for ED Observation.     INITIAL ASSESSMENT, COURSE AND PLAN  Care Narrative: Otherwise healthy middle-age female who presents with visual floaters of the right eye that have been going on for the last week.  She is hypertensive on arrival with otherwise reassuring vital signs.  Visual acuity is diminished however equal in both eyes.  Pressures are normal without concern for acute glaucoma.  There is no evidence of foreign body.  Bedside ultrasound shows some heterogeneous material in the posterior chamber that could be concerning for vitreous detachment or hemorrhage.  There is no obvious retinal detachment.  Plan for discharge with close ophthalmology  follow-up today.    7:01 AM - Upon reassessment, patient is resting comfortably with stable vital signs. No new complaints at this time.  Discussed results with patient and/or family as well as importance of ophthalmology follow-up as below.  Patient understands plan of care and strict return precautions for new or changing symptoms.      ADDITIONAL PROBLEM LIST  Problem #1: Right eye visual floaters -concern for vitreous detachment, discussed with ophthalmology, will follow-up in their clinic today for more comprehensive exam      DISPOSITION AND DISCUSSIONS  I have discussed management of the patient with the following physicians and MAHOGANY's:    6:29 PM -  I discussed the patient's case and the above findings with Dr. Ty (ophthalmology) who will see the patient in clinic today.     Escalation of care considered, and ultimately not performed:blood analysis and diagnostic imaging      The patient will return for new or worsening symptoms and is stable at the time of discharge.    DISPOSITION:  Patient will be discharged home in stable condition.    FOLLOW UP:  Jamie Ty M.D.  [address]  [facility] [address]  [phone]      call this morning for an appointment    [facility], Emergency Dept  [address] [place] [id number] [phone]    If symptoms worsen      FINAL DIAGNOSIS  1. Visual changes        The note accurately reflects work and decisions made by me.  Charley Jones M.D.  3/3/2023  7:15 AM     Shelley HENSON (Timibe), am scribing for, and in the presence of, Charley Jones M.D..    Electronically signed by: Shelley Grant), 3/3/2023    Charley HENSON M.D. personally performed the services described in this documentation, as scribed by Shelley Will in my presence, and it is both accurate and complete.

## 2023-03-03 NOTE — DISCHARGE INSTRUCTIONS
You were seen in the Emergency Department for visual changes.    Call ophthalmology above after 8am for close follow up appointment today.  Call our  [phone] to get in touch with Dr. Jones if this is not successful today.      Please use 1,000mg of tylenol or 600mg of ibuprofen every 6 hours as needed for pain.    Please follow up with your primary care physician.    Return to the Emergency Department with other concerns.

## 2023-03-03 NOTE — ED NOTES
Aruna Smith discharged via ambulation with family.  Discharge instructions given and reviewed, patient educated to follow up with Opthalmology, verbalized understanding.  All personal belongings in possession.  No questions at this time.

## 2023-03-03 NOTE — ED TRIAGE NOTES
"Chief Complaint   Patient presents with    Blurred Vision     Last Friday pt noticed flashing lights in her R eye, pt reports there is \"floaters\" normally baseline, however, today larger floaters present on the right eye. Pt called her eye MD, and pt was sent in the ER by the Eye doctor concerned for a rectal tear. Denies double vision, reports blurry vision on the right eye. GCS 15       52F to triage for above complaint.     Pt is alert and oriented, speaking in full sentences, follows commands and responds appropriately to questions. NAD. Resp are even and unlabored.      Pt placed in lobby. Pt educated on triage process. Pt encouraged to alert staff for any changes.     Patient and staff wearing appropriate PPE    BP (!) 163/109   Pulse 77   Temp 36.9 °C (98.4 °F) (Temporal)   Resp 18   Ht 1.626 m (5' 4\")   Wt 104 kg (230 lb 2.6 oz)   LMP 10/29/2012   SpO2 96% Comment: Room air  BMI 39.51 kg/m²    "

## 2023-03-22 ENCOUNTER — HOSPITAL ENCOUNTER (OUTPATIENT)
Dept: LAB | Facility: MEDICAL CENTER | Age: 53
End: 2023-03-22
Attending: FAMILY MEDICINE
Payer: COMMERCIAL

## 2023-03-22 DIAGNOSIS — F41.9 ANXIETY: ICD-10-CM

## 2023-03-22 DIAGNOSIS — E78.2 HYPERCHOLESTEROLEMIA WITH HYPERTRIGLYCERIDEMIA: ICD-10-CM

## 2023-03-22 DIAGNOSIS — R73.01 ELEVATED FASTING GLUCOSE: ICD-10-CM

## 2023-03-22 LAB
ALBUMIN SERPL BCP-MCNC: 4.1 G/DL (ref 3.2–4.9)
ALBUMIN/GLOB SERPL: 1.2 G/DL
ALP SERPL-CCNC: 116 U/L (ref 30–99)
ALT SERPL-CCNC: 20 U/L (ref 2–50)
ANION GAP SERPL CALC-SCNC: 12 MMOL/L (ref 7–16)
AST SERPL-CCNC: 16 U/L (ref 12–45)
BILIRUB SERPL-MCNC: 0.6 MG/DL (ref 0.1–1.5)
BUN SERPL-MCNC: 13 MG/DL (ref 8–22)
CALCIUM ALBUM COR SERPL-MCNC: 9.1 MG/DL (ref 8.5–10.5)
CALCIUM SERPL-MCNC: 9.2 MG/DL (ref 8.5–10.5)
CHLORIDE SERPL-SCNC: 106 MMOL/L (ref 96–112)
CHOLEST SERPL-MCNC: 173 MG/DL (ref 100–199)
CO2 SERPL-SCNC: 22 MMOL/L (ref 20–33)
CREAT SERPL-MCNC: 0.9 MG/DL (ref 0.5–1.4)
EST. AVERAGE GLUCOSE BLD GHB EST-MCNC: 140 MG/DL
GFR SERPLBLD CREATININE-BSD FMLA CKD-EPI: 76 ML/MIN/1.73 M 2
GLOBULIN SER CALC-MCNC: 3.5 G/DL (ref 1.9–3.5)
GLUCOSE SERPL-MCNC: 112 MG/DL (ref 65–99)
HBA1C MFR BLD: 6.5 % (ref 4–5.6)
HDLC SERPL-MCNC: 36 MG/DL
LDLC SERPL CALC-MCNC: 98 MG/DL
POTASSIUM SERPL-SCNC: 4.4 MMOL/L (ref 3.6–5.5)
PROT SERPL-MCNC: 7.6 G/DL (ref 6–8.2)
SODIUM SERPL-SCNC: 140 MMOL/L (ref 135–145)
TRIGL SERPL-MCNC: 195 MG/DL (ref 0–149)

## 2023-03-22 PROCEDURE — 36415 COLL VENOUS BLD VENIPUNCTURE: CPT

## 2023-03-22 PROCEDURE — 80061 LIPID PANEL: CPT

## 2023-03-22 PROCEDURE — 84443 ASSAY THYROID STIM HORMONE: CPT

## 2023-03-22 PROCEDURE — 83036 HEMOGLOBIN GLYCOSYLATED A1C: CPT

## 2023-03-22 PROCEDURE — 80053 COMPREHEN METABOLIC PANEL: CPT

## 2023-03-23 LAB — TSH SERPL DL<=0.005 MIU/L-ACNC: 1.36 UIU/ML (ref 0.38–5.33)

## 2023-03-28 ENCOUNTER — TELEMEDICINE (OUTPATIENT)
Dept: MEDICAL GROUP | Facility: MEDICAL CENTER | Age: 53
End: 2023-03-28
Payer: COMMERCIAL

## 2023-03-28 VITALS — DIASTOLIC BLOOD PRESSURE: 80 MMHG | RESPIRATION RATE: 14 BRPM | SYSTOLIC BLOOD PRESSURE: 122 MMHG

## 2023-03-28 DIAGNOSIS — E66.9 OBESITY, CLASS II, BMI 35-39.9: ICD-10-CM

## 2023-03-28 DIAGNOSIS — R73.03 PREDIABETES: ICD-10-CM

## 2023-03-28 DIAGNOSIS — E78.2 HYPERCHOLESTEROLEMIA WITH HYPERTRIGLYCERIDEMIA: ICD-10-CM

## 2023-03-28 DIAGNOSIS — H33.311 RETINAL TEAR OF RIGHT EYE: ICD-10-CM

## 2023-03-28 PROCEDURE — 99214 OFFICE O/P EST MOD 30 MIN: CPT | Mod: 95 | Performed by: FAMILY MEDICINE

## 2023-03-28 RX ORDER — METFORMIN HYDROCHLORIDE 500 MG/1
500 TABLET, EXTENDED RELEASE ORAL EVERY EVENING
Qty: 90 TABLET | Refills: 3 | Status: SHIPPED | OUTPATIENT
Start: 2023-03-28 | End: 2024-02-29 | Stop reason: SDUPTHER

## 2023-03-28 RX ORDER — LISINOPRIL 5 MG/1
5 TABLET ORAL DAILY
Qty: 90 TABLET | Refills: 3 | Status: SHIPPED | OUTPATIENT
Start: 2023-03-28 | End: 2024-02-29 | Stop reason: SDUPTHER

## 2023-03-28 NOTE — PROGRESS NOTES
Virtual Visit: Established Patient   This visit was conducted via Zoom  using secure and encrypted videoconferencing technology. The patient was in a private location in the state of [place].    The patient's identity was confirmed and verbal consent was obtained for this virtual visit.      CC:prediabetes, dyslipidemia, obesity                                                                                                                                        HPI:   Aruna presents today with the following.    1. Prediabetes  Has moderately elevated glucose but below 126.  A1c 6.5%.  discussed dietary changes.  Discussed reduced sweets and simple carbohydrates.  Discussed, adding metformin    2. Retinal tear of right eye  She did have a retinal tear.  Was seen by retinal specialist.  Had successful treatment.  Continue to follow with the retinal specialist.  Discussed, adding lisinopril.     3. Hypercholesterolemia with hypertriglyceridemia  Patient denies chest pain, chest pressure, palpitations or exertional shortness of breath. Lipids show high triglycerides and mildly low HDL.  Discussed increased activity.    4. Obesity, Class II, BMI 35-39.9 (CMS-HCC)  Working on better eating and exercise habits.      Patient Active Problem List    Diagnosis Date Noted    Retinal tear of right eye 03/28/2023    Prediabetes 03/28/2023    Psychophysiologic insomnia 08/23/2018    Hypercholesterolemia with hypertriglyceridemia     GERD without esophagitis     Colitis, indeterminate     Obesity, Class II, BMI 35-39.9 (CMS-[facility]) 07/30/2014    Attention deficit disorder of adult with hyperactivity     Family history of diabetes mellitus type II     Seasonal allergic rhinitis     Seasonal allergies     Anxiety        Current Outpatient Medications   Medication Sig Dispense Refill    lisinopril (PRINIVIL) 5 MG Tab Take 1 Tablet by mouth every day. 90 Tablet 3    metFORMIN ER (GLUCOPHAGE XR) 500 MG TABLET SR 24 HR Take 1 Tablet by mouth  every evening. 90 Tablet 3    LORazepam (ATIVAN) 0.5 MG Tab Take 1 Tablet by mouth every 8 hours as needed for Anxiety for up to 180 days. 90 tablets is a 30 day quantity 90 Tablet 5    cyclobenzaprine (FLEXERIL) 5 mg tablet TAKE ONE TABLET BY MOUTH THREE TIMES A DAY AS NEEDED FOR MUSCLE SPASMS 90 Tablet 5    buPROPion (WELLBUTRIN XL) 300 MG XL tablet Take 1 Tablet by mouth every morning. 90 Tablet 1    famotidine (PEPCID) 20 MG Tab TAKE ONE TABLET BY MOUTH TWICE A  Tablet 1    sertraline (ZOLOFT) 50 MG Tab TAKE ONE TABLET BY MOUTH DAILY 90 Tablet 3    albuterol 108 (90 Base) MCG/ACT Aero Soln inhalation aerosol Inhale 2 Puffs every 6 hours as needed for Shortness of Breath. 8.5 g 2    meclizine (ANTIVERT) 25 MG Tab Take 1 tablet by mouth 3 times a day as needed for Dizziness. 30 tablet 0    montelukast (SINGULAIR) 10 MG Tab Take 1 Tab by mouth every day. 30 Tab 6    fluticasone (FLONASE ALLERGY RELIEF) 50 MCG/ACT nasal spray Spray 1 Spray in nose 2 times a day. 16 g 0    Loratadine (CLARITIN PO) Take  by mouth.       No current facility-administered medications for this visit.         Allergies as of 03/28/2023 - Reviewed 03/28/2023   Allergen Reaction Noted    Quetiapine  09/25/2018    Xanax [alprazolam xr]  05/09/2014        ROS:  Denies chest pain, Shortness of breath, Edema.     /80 Comment: a week ago at ophthalmology  Resp 14 Comment: observed  LMP 10/29/2012       Physical Exam:  Constitutional: Alert, no distress, well-groomed.  Skin: No rashes in visible areas.  Eye: Round. Conjunctiva clear, No icterus.   ENMT: Lips pink without lesions, good dentition, moist mucous membranes. Phonation normal.  Neck: No masses, no thyromegaly. Moves freely without pain.  Respiratory: Unlabored respiratory effort, no cough or audible wheeze  Psych: Alert and oriented x3, normal affect and mood.          Assessment and Plan.   52 y.o. female with the following issues.    1. Prediabetes  Medications and lab  orders discussed and placed  - lisinopril (PRINIVIL) 5 MG Tab; Take 1 Tablet by mouth every day.  Dispense: 90 Tablet; Refill: 3  - metFORMIN ER (GLUCOPHAGE XR) 500 MG TABLET SR 24 HR; Take 1 Tablet by mouth every evening.  Dispense: 90 Tablet; Refill: 3  - HEMOGLOBIN A1C; Future  - Lipid Profile; Future  - CBC WITHOUT DIFFERENTIAL; Future  - Comp Metabolic Panel; Future    2. Retinal tear of right eye  She is healing well.  Dr MCMAHAN will see her in follow up in a few months.  - lisinopril (PRINIVIL) 5 MG Tab; Take 1 Tablet by mouth every day.  Dispense: 90 Tablet; Refill: 3  - HEMOGLOBIN A1C; Future  - CBC WITHOUT DIFFERENTIAL; Future    3. Hypercholesterolemia with hypertriglyceridemia  Follow up lab orders after dietary changes discussed and placed  - Lipid Profile; Future  - Comp Metabolic Panel; Future    4. Obesity, Class II, BMI 35-39.9 (CMS-HCC)  Discussed obesity and diet.      Recheck 8/30/2023, sooner as needed

## 2023-04-02 DIAGNOSIS — F51.04 PSYCHOPHYSIOLOGIC INSOMNIA: ICD-10-CM

## 2023-04-02 DIAGNOSIS — F41.9 ANXIETY: ICD-10-CM

## 2023-04-02 RX ORDER — DIAZEPAM 5 MG/1
5 TABLET ORAL EVERY 6 HOURS PRN
Qty: 90 TABLET | Refills: 2 | Status: SHIPPED | OUTPATIENT
Start: 2023-04-02 | End: 2023-08-30 | Stop reason: SDUPTHER

## 2023-05-24 DIAGNOSIS — K21.9 GERD WITHOUT ESOPHAGITIS: ICD-10-CM

## 2023-05-25 RX ORDER — FAMOTIDINE 20 MG/1
TABLET, FILM COATED ORAL
Qty: 180 TABLET | Refills: 1 | Status: SHIPPED | OUTPATIENT
Start: 2023-05-25 | End: 2023-11-20

## 2023-06-14 DIAGNOSIS — F90.9 ATTENTION DEFICIT DISORDER OF ADULT WITH HYPERACTIVITY: ICD-10-CM

## 2023-06-14 RX ORDER — BUPROPION HYDROCHLORIDE 300 MG/1
TABLET ORAL
Qty: 90 TABLET | Refills: 1 | Status: SHIPPED | OUTPATIENT
Start: 2023-06-14 | End: 2023-12-08

## 2023-08-30 ENCOUNTER — OFFICE VISIT (OUTPATIENT)
Dept: MEDICAL GROUP | Facility: MEDICAL CENTER | Age: 53
End: 2023-08-30
Payer: COMMERCIAL

## 2023-08-30 VITALS
RESPIRATION RATE: 16 BRPM | TEMPERATURE: 97.5 F | DIASTOLIC BLOOD PRESSURE: 80 MMHG | OXYGEN SATURATION: 93 % | HEART RATE: 66 BPM | BODY MASS INDEX: 37.19 KG/M2 | HEIGHT: 64 IN | SYSTOLIC BLOOD PRESSURE: 134 MMHG | WEIGHT: 217.81 LBS

## 2023-08-30 DIAGNOSIS — F51.04 PSYCHOPHYSIOLOGIC INSOMNIA: ICD-10-CM

## 2023-08-30 DIAGNOSIS — F41.9 ANXIETY: ICD-10-CM

## 2023-08-30 PROCEDURE — 99213 OFFICE O/P EST LOW 20 MIN: CPT | Performed by: FAMILY MEDICINE

## 2023-08-30 PROCEDURE — 3075F SYST BP GE 130 - 139MM HG: CPT | Performed by: FAMILY MEDICINE

## 2023-08-30 PROCEDURE — 3079F DIAST BP 80-89 MM HG: CPT | Performed by: FAMILY MEDICINE

## 2023-08-30 RX ORDER — DIAZEPAM 5 MG/1
5 TABLET ORAL EVERY 6 HOURS PRN
Qty: 90 TABLET | Refills: 2 | Status: SHIPPED | OUTPATIENT
Start: 2023-08-30 | End: 2023-11-28

## 2023-08-30 NOTE — PROGRESS NOTES
Chief Complaint   Patient presents with    Anxiety       Subjective:     HPI:   Aruna Smith presents today with the followin. Anxiety/Psychophysiologic insomnia  She has chronic anxiety.  She uses the diazepam as needed for that and for sleep.  PDMP review shows no inconsistencies.  She takes around 2 diazepam per day.  Rarely has to take more.  PDMP review shows no inconsistencies.          Patient Active Problem List    Diagnosis Date Noted    Retinal tear of right eye 2023    Prediabetes 2023    Psychophysiologic insomnia 2018    Hypercholesterolemia with hypertriglyceridemia     GERD without esophagitis     Colitis, indeterminate     Obesity, Class II, BMI 35-39.9 (CMS-[facility]) 2014    Attention deficit disorder of adult with hyperactivity     Family history of diabetes mellitus type II     Seasonal allergic rhinitis     Seasonal allergies     Anxiety        Current medicines (including changes today)  Current Outpatient Medications   Medication Sig Dispense Refill    diazePAM (VALIUM) 5 MG Tab Take 1 Tablet by mouth every 6 hours as needed for Anxiety for up to 90 days. 90 Tablet 2    buPROPion (WELLBUTRIN XL) 300 MG XL tablet TAKE ONE TABLET BY MOUTH EVERY MORNING 90 Tablet 1    famotidine (PEPCID) 20 MG Tab TAKE ONE TABLET BY MOUTH TWICE A  Tablet 1    lisinopril (PRINIVIL) 5 MG Tab Take 1 Tablet by mouth every day. 90 Tablet 3    metFORMIN ER (GLUCOPHAGE XR) 500 MG TABLET SR 24 HR Take 1 Tablet by mouth every evening. 90 Tablet 3    cyclobenzaprine (FLEXERIL) 5 mg tablet TAKE ONE TABLET BY MOUTH THREE TIMES A DAY AS NEEDED FOR MUSCLE SPASMS 90 Tablet 5    sertraline (ZOLOFT) 50 MG Tab TAKE ONE TABLET BY MOUTH DAILY 90 Tablet 3    albuterol 108 (90 Base) MCG/ACT Aero Soln inhalation aerosol Inhale 2 Puffs every 6 hours as needed for Shortness of Breath. 8.5 g 2    meclizine (ANTIVERT) 25 MG Tab Take 1 tablet by mouth 3 times a day as needed for Dizziness. 30 tablet 0     "fluticasone (FLONASE ALLERGY RELIEF) 50 MCG/ACT nasal spray Spray 1 Spray in nose 2 times a day. 16 g 0    Loratadine (CLARITIN PO) Take  by mouth.       No current facility-administered medications for this visit.       Allergies   Allergen Reactions    Quetiapine      Severe anger    Xanax [Alprazolam Xr]      shaking       ROS: As per HPI  denies chest pain or shortness of breath.       Objective:     /80 (BP Location: Right arm, Patient Position: Sitting, BP Cuff Size: Large adult)   Pulse 66   Temp 36.4 °C (97.5 °F) (Temporal)   Resp 16   Ht 1.626 m (5' 4\")   Wt 98.8 kg (217 lb 13 oz)   SpO2 93%  Body mass index is 37.39 kg/m².    Physical Exam:  Constitutional: Well-developed and well-nourished. Not diaphoretic. No distress. Lucid and fluent.  Skin: Skin is warm and dry. No rash noted.  Head: Atraumatic without lesions.  Eyes: Conjunctivae and extraocular motions are normal. Pupils are equal, round, and reactive to light. No scleral icterus.   Ears:  External ears unremarkable.   Neck: Supple, trachea midline. No thyromegaly present. No cervical or supraclavicular lymphadenopathy. No JVD or carotid bruits appreciated  Cardiovascular: Regular rate and rhythm.  Normal S1, S2 without murmur appreciated.  Chest: Effort normal. Clear to auscultation throughout. No adventitious sounds.   Abdomen: Soft, non tender, and without distention. Active bowel sounds in all four quadrants. No rebound, guarding, masses or hepatosplenomegaly.  Extremities: No cyanosis, clubbing, erythema, nor edema.   Neurological: Alert and oriented x 3. No tremor appreciated.  Psychiatric:  Behavior, mood, and affect are appropriate.       Assessment and Plan:     53 y.o. female with the following issues:    1. Anxiety  diazePAM (VALIUM) 5 MG Tab      2. Psychophysiologic insomnia  diazePAM (VALIUM) 5 MG Tab            Followup: Return in about 6 months (around 2/29/2024), or if symptoms worsen or fail to improve.  "

## 2023-09-20 ENCOUNTER — OFFICE VISIT (OUTPATIENT)
Dept: URGENT CARE | Facility: PHYSICIAN GROUP | Age: 53
End: 2023-09-20
Payer: COMMERCIAL

## 2023-09-20 VITALS
OXYGEN SATURATION: 95 % | HEART RATE: 82 BPM | DIASTOLIC BLOOD PRESSURE: 74 MMHG | BODY MASS INDEX: 36.37 KG/M2 | TEMPERATURE: 95.4 F | SYSTOLIC BLOOD PRESSURE: 110 MMHG | RESPIRATION RATE: 16 BRPM | WEIGHT: 213 LBS | HEIGHT: 64 IN

## 2023-09-20 DIAGNOSIS — M54.31 SCIATICA OF RIGHT SIDE: ICD-10-CM

## 2023-09-20 PROCEDURE — 3074F SYST BP LT 130 MM HG: CPT

## 2023-09-20 PROCEDURE — 3078F DIAST BP <80 MM HG: CPT

## 2023-09-20 PROCEDURE — 99213 OFFICE O/P EST LOW 20 MIN: CPT

## 2023-09-20 RX ORDER — METHYLPREDNISOLONE 4 MG/1
TABLET ORAL
Qty: 21 TABLET | Refills: 0 | Status: SHIPPED | OUTPATIENT
Start: 2023-09-20 | End: 2024-02-29

## 2023-09-20 ASSESSMENT — ENCOUNTER SYMPTOMS
BACK PAIN: 1
FOCAL WEAKNESS: 0
SENSORY CHANGE: 0
TINGLING: 0
FEVER: 0
SHORTNESS OF BREATH: 0
WEAKNESS: 0

## 2023-09-20 NOTE — LETTER
September 20, 2023    To Whom It May Concern:         This is confirmation that Aruna Smith attended her scheduled appointment with Betsy Pérez P.A.-C. on 9/20/23. Please excuse her absence from work today.          If you have any questions please do not hesitate to call me at the phone number listed below.    Sincerely,          Betsy Pérez P.A.-C.  [phone]

## 2023-09-20 NOTE — PROGRESS NOTES
Subjective:     CHIEF COMPLAINT  Chief Complaint   Patient presents with    Leg Pain     Right. Maybe sciatica x this morning.       HPI  Aruna Smith is a very pleasant 53 y.o. female who presents with low back pain that started this morning with radiation of pain down her right buttock and into her right hamstring.  She does not have a history of sciatica.  She reports that she was moving boxes yesterday that weighed approximately 50 pounds and believes that she may have injured her back doing so.  She denies any changes in bowel or bladder function.  She denies any numbness or tingling in her lower extremities.  She denies any weakness of her lower extremities.  She otherwise feels well.      REVIEW OF SYSTEMS  Review of Systems   Constitutional:  Negative for fever and malaise/fatigue.   Respiratory:  Negative for shortness of breath.    Cardiovascular:  Negative for chest pain.   Musculoskeletal:  Positive for back pain (Low back).   Neurological:  Negative for tingling, sensory change, focal weakness and weakness.       PAST MEDICAL HISTORY  Patient Active Problem List    Diagnosis Date Noted    Retinal tear of right eye 03/28/2023    Prediabetes 03/28/2023    Psychophysiologic insomnia 08/23/2018    Hypercholesterolemia with hypertriglyceridemia     GERD without esophagitis     Colitis, indeterminate     Obesity, Class II, BMI 35-39.9 (CMS-[facility]) 07/30/2014    Attention deficit disorder of adult with hyperactivity     Family history of diabetes mellitus type II     Seasonal allergic rhinitis     Seasonal allergies     Anxiety        SURGICAL HISTORY   has a past surgical history that includes vaginal hysterectomy total (10/29/2012) and cystoscopy (10/29/2012).    ALLERGIES  Allergies   Allergen Reactions    Quetiapine      Severe anger    Xanax [Alprazolam Xr]      shaking       CURRENT MEDICATIONS  Home Medications       Reviewed by Betsy Pérez P.A.-C. (Physician Assistant) on 09/20/23 at 1311   "Med List Status: <None>     Medication Last Dose Status   albuterol 108 (90 Base) MCG/ACT Aero Soln inhalation aerosol Taking Active   buPROPion (WELLBUTRIN XL) 300 MG XL tablet Taking Active   cyclobenzaprine (FLEXERIL) 5 mg tablet Taking Active   diazePAM (VALIUM) 5 MG Tab Taking Active   famotidine (PEPCID) 20 MG Tab Taking Active   fluticasone (FLONASE ALLERGY RELIEF) 50 MCG/ACT nasal spray Not Taking Active   lisinopril (PRINIVIL) 5 MG Tab Taking Active   Loratadine (CLARITIN PO) PRN Active   meclizine (ANTIVERT) 25 MG Tab PRN Active   metFORMIN ER (GLUCOPHAGE XR) 500 MG TABLET SR 24 HR Taking Active   sertraline (ZOLOFT) 50 MG Tab Taking Active                    SOCIAL HISTORY  Social History     Tobacco Use    Smoking status: Former     Current packs/day: 0.00     Average packs/day: 0.5 packs/day for 4.0 years (2.0 ttl pk-yrs)     Types: Cigarettes     Start date: 1984     Quit date: 1988     Years since quittin.0    Smokeless tobacco: Never   Vaping Use    Vaping Use: Never used   Substance and Sexual Activity    Alcohol use: No     Alcohol/week: 0.0 oz     Comment: very rare, around twice a year    Drug use: No    Sexual activity: Yes     Partners: Male       FAMILY HISTORY  Family History   Problem Relation Age of Onset    Diabetes Mother     Hypertension Mother     Heart Disease Father         mitral valve replacement, rheumatic fever/congestive heart failure    Hypertension Brother     Heart Disease Brother 58         sudden MI    Hypertension Maternal Grandfather           Objective:     VITAL SIGNS: /74 (BP Location: Right arm, Patient Position: Sitting, BP Cuff Size: Large adult)   Pulse 82   Temp (!) 35.2 °C (95.4 °F) (Temporal)   Resp 16   Ht 1.626 m (5' 4\")   Wt 96.6 kg (213 lb)   LMP 10/29/2012   SpO2 95%   BMI 36.56 kg/m²     PHYSICAL EXAM  Physical Exam  Vitals reviewed.   Constitutional:       General: She is not in acute distress.     Appearance: Normal " appearance. She is not ill-appearing or toxic-appearing.   HENT:      Head: Normocephalic and atraumatic.      Mouth/Throat:      Mouth: Mucous membranes are moist.   Eyes:      Conjunctiva/sclera: Conjunctivae normal.      Pupils: Pupils are equal, round, and reactive to light.   Cardiovascular:      Rate and Rhythm: Normal rate.   Pulmonary:      Effort: Pulmonary effort is normal. No respiratory distress.   Musculoskeletal:      Cervical back: Normal.      Thoracic back: Normal.      Lumbar back: Spasms and tenderness present. No swelling, edema, signs of trauma or bony tenderness. Normal range of motion.        Back:       Comments: Tenderness to palpation in the right lumbar/sacral region with muscle spasms palpated on exam.  No bony midline tenderness.  Full active range of motion to flexion, extension, and twisting motions.   Skin:     General: Skin is warm and dry.   Neurological:      General: No focal deficit present.      Mental Status: She is alert and oriented to person, place, and time.   Psychiatric:         Mood and Affect: Mood normal.         Assessment/Plan:     1. Sciatica of right side  - methylPREDNISolone (MEDROL DOSEPAK) 4 MG Tablet Therapy Pack; Follow schedule on package instructions.  Dispense: 21 Tablet; Refill: 0  -Apply heat to low back  -Gentle stretching as tolerated  -Tylenol OTC as needed for pain  -Discontinue use of Medrol Dosepak if elevations in blood sugar or blood pressure occur  -Return to clinic if symptoms worsen or fail to resolve    MDM/Comments:  Patient has stable vital signs and is non-toxic appearing. Discussed supportive care with heat, rest, Tylenol as needed.  Discussed use of Medrol Dosepak for low back strain with right-sided sciatica.  Patient has a history of hypertension and elevated glucose.  Discussed that Medrol Dosepak can elevate blood pressure and glucose levels.  Patient consented to use and reports that she will monitor her blood pressure and glucose  levels at home and will discontinue use if elevations occur.  Discussed the importance of avoiding NSAIDs such as ibuprofen while taking methylprednisone.  Red flag symptoms discussed with patient with strict ER precautions.  Patient demonstrated understanding of treatment plan at this time and will RTC if symptoms worsen or fail to resolve.     Differential diagnosis, natural history, supportive care, and indications for immediate follow-up discussed. All questions answered. Patient agrees with the plan of care.    Follow-up as needed if symptoms worsen or fail to improve to PCP, Urgent care or Emergency Room.    I have personally reviewed prior external notes and test results pertinent to today's visit.  I have independently reviewed and interpreted all diagnostics ordered during this urgent care acute visit.   Discussed management options (risks,benefits, and alternatives to treatment). Pt expresses understanding and the treatment plan was agreed upon. Questions were encouraged and answered to pt's satisfaction.    Please note that this dictation was created using voice recognition software. I have made a reasonable attempt to correct obvious errors, but I expect that there are errors of grammar and possibly content that I did not discover before finalizing the note.

## 2023-11-18 DIAGNOSIS — F90.9 ATTENTION DEFICIT DISORDER OF ADULT WITH HYPERACTIVITY: ICD-10-CM

## 2023-11-18 DIAGNOSIS — K21.9 GERD WITHOUT ESOPHAGITIS: ICD-10-CM

## 2023-11-20 RX ORDER — FAMOTIDINE 20 MG/1
20 TABLET, FILM COATED ORAL 2 TIMES DAILY
Qty: 180 TABLET | Refills: 1 | Status: SHIPPED | OUTPATIENT
Start: 2023-11-20

## 2023-12-08 DIAGNOSIS — F90.9 ATTENTION DEFICIT DISORDER OF ADULT WITH HYPERACTIVITY: ICD-10-CM

## 2023-12-08 RX ORDER — BUPROPION HYDROCHLORIDE 300 MG/1
TABLET ORAL
Qty: 90 TABLET | Refills: 1 | Status: SHIPPED | OUTPATIENT
Start: 2023-12-08

## 2024-02-22 ENCOUNTER — OFFICE VISIT (OUTPATIENT)
Dept: URGENT CARE | Facility: PHYSICIAN GROUP | Age: 54
End: 2024-02-22
Payer: COMMERCIAL

## 2024-02-22 VITALS
TEMPERATURE: 97 F | WEIGHT: 215 LBS | SYSTOLIC BLOOD PRESSURE: 104 MMHG | BODY MASS INDEX: 36.7 KG/M2 | DIASTOLIC BLOOD PRESSURE: 64 MMHG | OXYGEN SATURATION: 96 % | HEIGHT: 64 IN | HEART RATE: 76 BPM | RESPIRATION RATE: 16 BRPM

## 2024-02-22 DIAGNOSIS — H10.33 ACUTE CONJUNCTIVITIS OF BOTH EYES, UNSPECIFIED ACUTE CONJUNCTIVITIS TYPE: ICD-10-CM

## 2024-02-22 PROCEDURE — 3078F DIAST BP <80 MM HG: CPT | Performed by: PHYSICIAN ASSISTANT

## 2024-02-22 PROCEDURE — 99213 OFFICE O/P EST LOW 20 MIN: CPT | Performed by: PHYSICIAN ASSISTANT

## 2024-02-22 PROCEDURE — 3074F SYST BP LT 130 MM HG: CPT | Performed by: PHYSICIAN ASSISTANT

## 2024-02-22 RX ORDER — POLYMYXIN B SULFATE AND TRIMETHOPRIM 1; 10000 MG/ML; [USP'U]/ML
1 SOLUTION OPHTHALMIC 4 TIMES DAILY
Qty: 10 ML | Refills: 0 | Status: SHIPPED | OUTPATIENT
Start: 2024-02-22 | End: 2024-02-29

## 2024-02-22 ASSESSMENT — ENCOUNTER SYMPTOMS
BLURRED VISION: 0
VOMITING: 0
EYE REDNESS: 1
PHOTOPHOBIA: 0
EYE PAIN: 1
FEVER: 0
DIZZINESS: 0
SORE THROAT: 0
EYE DISCHARGE: 1
NAUSEA: 0
DOUBLE VISION: 0
CHILLS: 0

## 2024-02-22 ASSESSMENT — VISUAL ACUITY: OU: 1

## 2024-02-22 NOTE — PROGRESS NOTES
"Subjective     Aruna Smith is a 53 y.o. female who presents with Other (Pt woke up with crust eyes, onset this morning )    HPI:  Aruna Smith is a 53 y.o. female who presents today for evaluation of possible eye infection.  Patient reports that she woke up this morning with both of her eyes red, irritated, and \"stuck shut\" with crusty discharge.  She does not wear contacts.  Has not had any visual changes.  Wash her face over the discharge but continues to have some mild discomfort.  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        Review of Systems   Constitutional:  Negative for chills and fever.   HENT:  Negative for congestion and sore throat.    Eyes:  Positive for pain, discharge and redness. Negative for blurred vision, double vision and photophobia.   Gastrointestinal:  Negative for nausea and vomiting.   Neurological:  Negative for dizziness.           PMH:  has a past medical history of Allergic rhinitis, Anxiety, Attention deficit disorder of adult with hyperactivity, Colitis, indeterminate, Family history of diabetes mellitus type II, GERD without esophagitis, Hypercholesterolemia with hypertriglyceridemia, Other specified symptom associated with female genital organs, Seasonal allergies, and Shingles (5/2014).  MEDS:   Current Outpatient Medications:     polymixin-trimethoprim (POLYTRIM) 25981-6.1 UNIT/ML-% Solution, Administer 1 Drop into both eyes 4 times a day for 7 days., Disp: 10 mL, Rfl: 0    buPROPion (WELLBUTRIN XL) 300 MG XL tablet, TAKE ONE TABLET BY MOUTH EVERY MORNING, Disp: 90 Tablet, Rfl: 1    famotidine (PEPCID) 20 MG Tab, TAKE 1 TABLET BY MOUTH TWICE A DAY, Disp: 180 Tablet, Rfl: 1    sertraline (ZOLOFT) 50 MG Tab, TAKE ONE TABLET BY MOUTH DAILY, Disp: 90 Tablet, Rfl: 3    methylPREDNISolone (MEDROL DOSEPAK) 4 MG Tablet Therapy Pack, Follow schedule on package instructions., Disp: 21 Tablet, Rfl: 0    " "lisinopril (PRINIVIL) 5 MG Tab, Take 1 Tablet by mouth every day., Disp: 90 Tablet, Rfl: 3    metFORMIN ER (GLUCOPHAGE XR) 500 MG TABLET SR 24 HR, Take 1 Tablet by mouth every evening., Disp: 90 Tablet, Rfl: 3    cyclobenzaprine (FLEXERIL) 5 mg tablet, TAKE ONE TABLET BY MOUTH THREE TIMES A DAY AS NEEDED FOR MUSCLE SPASMS, Disp: 90 Tablet, Rfl: 5    albuterol 108 (90 Base) MCG/ACT Aero Soln inhalation aerosol, Inhale 2 Puffs every 6 hours as needed for Shortness of Breath., Disp: 8.5 g, Rfl: 2    meclizine (ANTIVERT) 25 MG Tab, Take 1 tablet by mouth 3 times a day as needed for Dizziness., Disp: 30 tablet, Rfl: 0    Loratadine (CLARITIN PO), Take  by mouth., Disp: , Rfl:     fluticasone (FLONASE ALLERGY RELIEF) 50 MCG/ACT nasal spray, Spray 1 Spray in nose 2 times a day. (Patient not taking: Reported on 9/20/2023), Disp: 16 g, Rfl: 0  ALLERGIES:   Allergies   Allergen Reactions    Quetiapine      Severe anger    Xanax [Alprazolam Xr]      shaking     SURGHX:   Past Surgical History:   Procedure Laterality Date    VAGINAL HYSTERECTOMY TOTAL  10/29/2012    Performed by Michael Stewart M.D. at SURGERY SAME DAY [facility] ORS    CYSTOSCOPY  10/29/2012    Performed by Michael Stewart M.D. at SURGERY SAME DAY [facility] ORS     SOCHX:  reports that she quit smoking about 35 years ago. Her smoking use included cigarettes. She started smoking about 39 years ago. She has a 2 pack-year smoking history. She has never used smokeless tobacco. She reports that she does not drink alcohol and does not use drugs.  FH: Family history was reviewed, no pertinent findings to report      Objective     /64 (BP Location: Left arm, Patient Position: Sitting, BP Cuff Size: Adult)   Pulse 76   Temp 36.1 °C (97 °F) (Temporal)   Resp 16   Ht 1.626 m (5' 4\")   Wt 97.5 kg (215 lb)   LMP 10/29/2012   SpO2 96%   BMI 36.90 kg/m²      Physical Exam  Constitutional:       General: She is not in acute distress.     Appearance: She is not " diaphoretic.   HENT:      Head: Normocephalic and atraumatic.      Right Ear: External ear normal.      Left Ear: External ear normal.   Eyes:      General: Vision grossly intact.         Right eye: No discharge or hordeolum.         Left eye: No discharge or hordeolum.      Extraocular Movements: Extraocular movements intact.      Conjunctiva/sclera:      Right eye: Right conjunctiva is injected. No exudate.     Left eye: Left conjunctiva is injected. No exudate.     Pupils: Pupils are equal, round, and reactive to light.   Pulmonary:      Effort: Pulmonary effort is normal. No respiratory distress.   Musculoskeletal:      Cervical back: Normal range of motion.   Skin:     Findings: No rash.   Neurological:      Mental Status: She is alert and oriented to person, place, and time.   Psychiatric:         Mood and Affect: Mood and affect normal.         Cognition and Memory: Memory normal.         Judgment: Judgment normal.           Assessment & Plan       1. Acute conjunctivitis of both eyes, unspecified acute conjunctivitis type  - polymixin-trimethoprim (POLYTRIM) 26812-5.1 UNIT/ML-% Solution; Administer 1 Drop into both eyes 4 times a day for 7 days.  Dispense: 10 mL; Refill: 0  Will initiate treatment for possibility of bacterial conjunctivitis given her exposures at her workplace.  I did also discussed the possibility that this could be viral or allergic in nature.  If she is not getting significant improvement after using the eyedrops for 2 to 3 days she can probably discontinue them as this would indicate more than likely that it is not bacterial.  She can apply warm compresses to the area to help move discharge.  Also discussed cleaning the eyelids and eyelashes with diluted baby shampoo if needed.  Recommend that she change her pillowcases daily.  Likewise, she should not reuse any towels or washcloths that she uses to wash her dry her face.  Importance of good hand hygiene discussed.  Note provided for  work.  Follow-up as needed.            Differential Diagnosis, natural history, and supportive care discussed. Return to the Urgent Care or follow up with your PCP if symptoms fail to resolve, or for any new or worsening symptoms. Emergency room precautions discussed. Patient and/or family appears understanding of information.

## 2024-02-22 NOTE — LETTER
February 22, 2024         Patient: Aruna Smith   YOB: 1970   Date of Visit: 2/22/2024           To Whom it May Concern:    Aruna Smith was seen in my clinic on 2/22/2024.  Please excuse her absence from work for today and tomorrow.        Sincerely,           Nupur Hopkins P.A.-C.  Electronically Signed

## 2024-02-29 ENCOUNTER — OFFICE VISIT (OUTPATIENT)
Dept: MEDICAL GROUP | Facility: MEDICAL CENTER | Age: 54
End: 2024-02-29
Payer: COMMERCIAL

## 2024-02-29 VITALS
DIASTOLIC BLOOD PRESSURE: 74 MMHG | RESPIRATION RATE: 18 BRPM | SYSTOLIC BLOOD PRESSURE: 132 MMHG | WEIGHT: 215 LBS | TEMPERATURE: 98 F | HEIGHT: 64 IN | HEART RATE: 78 BPM | BODY MASS INDEX: 36.7 KG/M2 | OXYGEN SATURATION: 96 %

## 2024-02-29 DIAGNOSIS — R73.03 PREDIABETES: ICD-10-CM

## 2024-02-29 DIAGNOSIS — I10 ESSENTIAL HYPERTENSION: ICD-10-CM

## 2024-02-29 DIAGNOSIS — E78.5 DYSLIPIDEMIA: ICD-10-CM

## 2024-02-29 DIAGNOSIS — Z86.16 HISTORY OF 2019 NOVEL CORONAVIRUS DISEASE (COVID-19): ICD-10-CM

## 2024-02-29 DIAGNOSIS — Z23 NEED FOR IMMUNIZATION AGAINST INFLUENZA: ICD-10-CM

## 2024-02-29 DIAGNOSIS — E66.9 OBESITY, CLASS II, BMI 35-39.9: ICD-10-CM

## 2024-02-29 DIAGNOSIS — K21.9 GERD WITHOUT ESOPHAGITIS: ICD-10-CM

## 2024-02-29 DIAGNOSIS — Z23 FLU VACCINE NEED: ICD-10-CM

## 2024-02-29 PROCEDURE — 90471 IMMUNIZATION ADMIN: CPT | Performed by: FAMILY MEDICINE

## 2024-02-29 PROCEDURE — 90686 IIV4 VACC NO PRSV 0.5 ML IM: CPT | Performed by: FAMILY MEDICINE

## 2024-02-29 PROCEDURE — 3075F SYST BP GE 130 - 139MM HG: CPT | Performed by: FAMILY MEDICINE

## 2024-02-29 PROCEDURE — 99214 OFFICE O/P EST MOD 30 MIN: CPT | Mod: 25 | Performed by: FAMILY MEDICINE

## 2024-02-29 PROCEDURE — 3078F DIAST BP <80 MM HG: CPT | Performed by: FAMILY MEDICINE

## 2024-02-29 RX ORDER — METFORMIN HYDROCHLORIDE 500 MG/1
500 TABLET, EXTENDED RELEASE ORAL EVERY EVENING
Qty: 90 TABLET | Refills: 3 | Status: SHIPPED | OUTPATIENT
Start: 2024-02-29

## 2024-02-29 RX ORDER — LISINOPRIL 5 MG/1
5 TABLET ORAL DAILY
Qty: 90 TABLET | Refills: 3 | Status: SHIPPED | OUTPATIENT
Start: 2024-02-29

## 2024-02-29 ASSESSMENT — PATIENT HEALTH QUESTIONNAIRE - PHQ9: CLINICAL INTERPRETATION OF PHQ2 SCORE: 0

## 2024-02-29 NOTE — PROGRESS NOTES
Chief Complaint   Patient presents with    Follow-Up     Q6m      Medication Refill    Prediabetes    Hypertension    Dyslipidemia       Subjective:     HPI:   Aruna Smith presents today with the followin. Prediabetes  Discussed her prediabetes.  She is tolerating the metformin.  Her last A1c in March of last year and actually showed a glycohemoglobin of 6.5% consistent with diabetes so her fasting glucose was still below a diabetes level.  Patient states she is feeling well.  She is trying to be more active.  She is attempting to change her dietary habits.  Follow-up lab orders discussed and placed.    2. Essential hypertension  HTN - Chronic condition stable. Currently taking all meds as directed.   She is not taking baby aspirin daily.   She  is occasionally  monitoring BP at home.  States has been generally 130s over 70s, is at that level today.  Denies symptoms low BP: light-headed, tunnel-vision, unusual fatigue.   Denies symptoms high BP:pounding headache, visual changes, palpitations, flushed face.   Denies medicine side effects: unusual fatigue, slow heartbeat, foot/leg swelling, cough.  Lab orders discussed and placed.  Lisinopril renewal placed.    3. Dyslipidemia  Patient denies chest pain, chest pressure, palpitations or exertional shortness of breath. Patient is not on lipid-lowering medication.  Most recent lipids from last year showed good total and LDL cholesterol but mildly low protective cholesterol and mildly high triglycerides.  Patient has continued to try to improve her diet and exercise.  Patient is a former smoker.  Quit over 30 years ago.  Patient takes no aspirin daily. Patient has no history of myocardial infarction, stroke or PVD.  Lab orders discussed and placed.    4. GERD without esophagitis  The patient feels the current medication regimen of famotidine is controlling the gastroesophageal reflux symptoms well. Denies dysphagia, reflux symptoms, acidity, abdominal pain or  visible blood or mucus in the stool. Denies vomiting or hematemesis. Denies burping or abdominal bloating. Patient avoids nonsteroidal anti-inflammatory drugs. Avoids heavy meals or eating within 2 hours of bedtime.  Lab orders discussed and placed.    5. Obesity, Class II, BMI 35-39.9 (CMS-HCC)  Patient continues to struggle somewhat with weight and activity.  However, she is still greater than 70 pounds down from her highest weight.    6. History of 2019 novel coronavirus disease (COVID-19)  Patient last had COVID in December 2021.  She had lost smell and taste but has largely recovered that.  She did have the Moderna immunization but has not elected to take any boosters.  She will consider the newest COVID-vaccine.    7. Need for immunization against influenza  Flu vaccine administered today        Patient Active Problem List    Diagnosis Date Noted    History of 2019 novel coronavirus disease (COVID-19) 02/29/2024    Essential hypertension 02/29/2024    Dyslipidemia 02/29/2024    Retinal tear of right eye 03/28/2023    Prediabetes 03/28/2023    Psychophysiologic insomnia 08/23/2018    Hypercholesterolemia with hypertriglyceridemia     GERD without esophagitis     Colitis, indeterminate     Obesity, Class II, BMI 35-39.9 (CMS-HCC) 07/30/2014    Attention deficit disorder of adult with hyperactivity     Family history of diabetes mellitus type II     Seasonal allergic rhinitis     Seasonal allergies     Anxiety        Current medicines (including changes today)  Current Outpatient Medications   Medication Sig Dispense Refill    lisinopril (PRINIVIL) 5 MG Tab Take 1 Tablet by mouth every day. 90 Tablet 3    metFORMIN ER (GLUCOPHAGE XR) 500 MG TABLET SR 24 HR Take 1 Tablet by mouth every evening. 90 Tablet 3    polymixin-trimethoprim (POLYTRIM) 76526-3.1 UNIT/ML-% Solution Administer 1 Drop into both eyes 4 times a day for 7 days. 10 mL 0    buPROPion (WELLBUTRIN XL) 300 MG XL tablet TAKE ONE TABLET BY MOUTH EVERY  "MORNING 90 Tablet 1    famotidine (PEPCID) 20 MG Tab TAKE 1 TABLET BY MOUTH TWICE A  Tablet 1    sertraline (ZOLOFT) 50 MG Tab TAKE ONE TABLET BY MOUTH DAILY 90 Tablet 3    cyclobenzaprine (FLEXERIL) 5 mg tablet TAKE ONE TABLET BY MOUTH THREE TIMES A DAY AS NEEDED FOR MUSCLE SPASMS 90 Tablet 5    albuterol 108 (90 Base) MCG/ACT Aero Soln inhalation aerosol Inhale 2 Puffs every 6 hours as needed for Shortness of Breath. 8.5 g 2    meclizine (ANTIVERT) 25 MG Tab Take 1 tablet by mouth 3 times a day as needed for Dizziness. 30 tablet 0    fluticasone (FLONASE ALLERGY RELIEF) 50 MCG/ACT nasal spray Spray 1 Spray in nose 2 times a day. 16 g 0    Loratadine (CLARITIN PO) Take  by mouth.       No current facility-administered medications for this visit.       Allergies   Allergen Reactions    Quetiapine      Severe anger    Xanax [Alprazolam Xr]      shaking       ROS: As per HPI       Objective:     /74   Pulse 78   Temp 36.7 °C (98 °F)   Resp 18   Ht 1.626 m (5' 4\")   Wt 97.5 kg (215 lb)   SpO2 96%  Body mass index is 36.9 kg/m².    Physical Exam:  Constitutional: Well-developed and well-nourished. Not diaphoretic. No distress. Lucid and fluent.  Skin: Skin is warm and dry. No rash noted.  Head: Atraumatic without lesions.  Eyes: Conjunctivae and extraocular motions are normal. Pupils are equal, round, and reactive to light. No scleral icterus.   Ears:  External ears unremarkable.   Neck: Supple, trachea midline. No thyromegaly present. No cervical or supraclavicular lymphadenopathy. No JVD or carotid bruits appreciated  Cardiovascular: Regular rate and rhythm.  Normal S1, S2 without murmur appreciated.  Chest: Effort normal. Clear to auscultation throughout. No adventitious sounds.   Abdomen: Soft, non tender, and without distention. Active bowel sounds in all four quadrants. No rebound, guarding, masses or hepatosplenomegaly.  Extremities: No cyanosis, clubbing, erythema, nor edema.   Neurological: " Alert and oriented x 3.  Movements are symmetric, gait is normal.  Psychiatric:  Behavior, mood, and affect are appropriate.       Assessment and Plan:     53 y.o. female with the following issues:    1. Prediabetes  lisinopril (PRINIVIL) 5 MG Tab    metFORMIN ER (GLUCOPHAGE XR) 500 MG TABLET SR 24 HR    TSH    HEMOGLOBIN A1C      2. Essential hypertension  Comp Metabolic Panel    CBC WITHOUT DIFFERENTIAL    TSH    Lipid Profile      3. Dyslipidemia  Comp Metabolic Panel    TSH    Lipid Profile      4. GERD without esophagitis  Comp Metabolic Panel    CBC WITHOUT DIFFERENTIAL      5. Obesity, Class II, BMI 35-39.9 (CMS-HCC)  TSH    Patient identified as having weight management issue.  Appropriate orders and counseling given.      6. History of 2019 novel coronavirus disease (COVID-19)        7. Need for immunization against influenza  INFLUENZA VACCINE QUAD INJ (PF)      8. Flu vaccine need              Followup: Return in about 6 months (around 8/29/2024), or if symptoms worsen or fail to improve.

## 2024-04-08 ENCOUNTER — OFFICE VISIT (OUTPATIENT)
Dept: URGENT CARE | Facility: PHYSICIAN GROUP | Age: 54
End: 2024-04-08
Payer: COMMERCIAL

## 2024-04-08 VITALS
RESPIRATION RATE: 16 BRPM | HEIGHT: 64 IN | BODY MASS INDEX: 36.7 KG/M2 | DIASTOLIC BLOOD PRESSURE: 70 MMHG | WEIGHT: 215 LBS | SYSTOLIC BLOOD PRESSURE: 110 MMHG | OXYGEN SATURATION: 96 % | TEMPERATURE: 97.5 F | HEART RATE: 90 BPM

## 2024-04-08 DIAGNOSIS — J32.9 RHINOSINUSITIS: ICD-10-CM

## 2024-04-08 DIAGNOSIS — R05.1 ACUTE COUGH: ICD-10-CM

## 2024-04-08 LAB
FLUAV RNA SPEC QL NAA+PROBE: NEGATIVE
FLUBV RNA SPEC QL NAA+PROBE: NEGATIVE
RSV RNA SPEC QL NAA+PROBE: NEGATIVE
SARS-COV-2 RNA RESP QL NAA+PROBE: NEGATIVE

## 2024-04-08 PROCEDURE — 3078F DIAST BP <80 MM HG: CPT | Performed by: FAMILY MEDICINE

## 2024-04-08 PROCEDURE — 0241U POCT CEPHEID COV-2, FLU A/B, RSV - PCR: CPT | Performed by: FAMILY MEDICINE

## 2024-04-08 PROCEDURE — 3074F SYST BP LT 130 MM HG: CPT | Performed by: FAMILY MEDICINE

## 2024-04-08 PROCEDURE — 99213 OFFICE O/P EST LOW 20 MIN: CPT | Performed by: FAMILY MEDICINE

## 2024-04-08 RX ORDER — AMOXICILLIN AND CLAVULANATE POTASSIUM 875; 125 MG/1; MG/1
1 TABLET, FILM COATED ORAL 2 TIMES DAILY
Qty: 14 TABLET | Refills: 0 | Status: SHIPPED | OUTPATIENT
Start: 2024-04-08 | End: 2024-04-15

## 2024-04-08 RX ORDER — BENZONATATE 100 MG/1
100 CAPSULE ORAL 3 TIMES DAILY PRN
Qty: 30 CAPSULE | Refills: 0 | Status: SHIPPED | OUTPATIENT
Start: 2024-04-08

## 2024-04-08 ASSESSMENT — ENCOUNTER SYMPTOMS
VOMITING: 0
NAUSEA: 0
EYE REDNESS: 0
EYE DISCHARGE: 0
MYALGIAS: 0
WEIGHT LOSS: 0

## 2024-05-16 DIAGNOSIS — K21.9 GERD WITHOUT ESOPHAGITIS: ICD-10-CM

## 2024-05-16 NOTE — TELEPHONE ENCOUNTER
Received request via: Pharmacy    Was the patient seen in the last year in this department? Yes    Does the patient have an active prescription (recently filled or refills available) for medication(s) requested? No    Pharmacy Name:  [facility] [id number] - [place], [place] - [address]     Does the patient have skilled nursing Plus and need 100 day supply (blood pressure, diabetes and cholesterol meds only)? Patient does not have SCP

## 2024-05-17 RX ORDER — FAMOTIDINE 20 MG/1
20 TABLET, FILM COATED ORAL 2 TIMES DAILY
Qty: 180 TABLET | Refills: 1 | Status: SHIPPED | OUTPATIENT
Start: 2024-05-17

## 2024-06-02 DIAGNOSIS — F90.9 ATTENTION DEFICIT DISORDER OF ADULT WITH HYPERACTIVITY: ICD-10-CM

## 2024-06-03 RX ORDER — BUPROPION HYDROCHLORIDE 300 MG/1
TABLET ORAL
Qty: 90 TABLET | Refills: 1 | Status: SHIPPED | OUTPATIENT
Start: 2024-06-03

## 2024-06-15 DIAGNOSIS — F51.04 PSYCHOPHYSIOLOGIC INSOMNIA: ICD-10-CM

## 2024-06-15 DIAGNOSIS — F41.9 ANXIETY: ICD-10-CM

## 2024-06-15 DIAGNOSIS — M62.830 MUSCLE SPASM OF BACK: ICD-10-CM

## 2024-06-15 RX ORDER — DIAZEPAM 5 MG/1
TABLET ORAL
Qty: 90 TABLET | Refills: 2 | Status: SHIPPED | OUTPATIENT
Start: 2024-06-15 | End: 2024-09-13

## 2024-06-15 RX ORDER — CYCLOBENZAPRINE HCL 5 MG
TABLET ORAL
Qty: 90 TABLET | Refills: 5 | Status: SHIPPED | OUTPATIENT
Start: 2024-06-15

## 2024-08-29 ENCOUNTER — OFFICE VISIT (OUTPATIENT)
Dept: URGENT CARE | Facility: PHYSICIAN GROUP | Age: 54
End: 2024-08-29
Payer: COMMERCIAL

## 2024-08-29 VITALS
RESPIRATION RATE: 16 BRPM | SYSTOLIC BLOOD PRESSURE: 154 MMHG | TEMPERATURE: 97.4 F | WEIGHT: 215 LBS | DIASTOLIC BLOOD PRESSURE: 88 MMHG | HEIGHT: 64 IN | BODY MASS INDEX: 36.7 KG/M2 | OXYGEN SATURATION: 96 % | HEART RATE: 115 BPM

## 2024-08-29 DIAGNOSIS — R11.2 NAUSEA VOMITING AND DIARRHEA: ICD-10-CM

## 2024-08-29 DIAGNOSIS — R19.7 NAUSEA VOMITING AND DIARRHEA: ICD-10-CM

## 2024-08-29 RX ORDER — ONDANSETRON 4 MG/1
4 TABLET, ORALLY DISINTEGRATING ORAL EVERY 6 HOURS PRN
Qty: 20 TABLET | Refills: 0 | Status: SHIPPED | OUTPATIENT
Start: 2024-08-29

## 2024-08-29 ASSESSMENT — ENCOUNTER SYMPTOMS
COUGH: 0
SORE THROAT: 0
DIARRHEA: 1
DIAPHORESIS: 0
ABDOMINAL PAIN: 0
MYALGIAS: 0
HEADACHES: 1
NAUSEA: 1
DIZZINESS: 0
FEVER: 0
CHILLS: 0
FLANK PAIN: 0
CONSTIPATION: 0
TINGLING: 0

## 2024-08-29 ASSESSMENT — PAIN SCALES - GENERAL: PAINLEVEL: 5=MODERATE PAIN

## 2024-08-29 NOTE — LETTER
August 29, 2024    To Whom It May Concern:         This is confirmation that Aruna Smith attended her scheduled appointment with Richard Davis P.A.-C. on 8/29/24.  Please excuse the patient's absence from work on 8/29/2024 and 8/30/2024.         If you have any questions please do not hesitate to call me at the phone number listed below.    Sincerely,          Richard Davis P.A.-C.  [phone]

## 2024-08-29 NOTE — PROGRESS NOTES
Subjective:     CHIEF COMPLAINT  Chief Complaint   Patient presents with    Emesis     X 4 days  Dizziness and headache     Diarrhea       HPI  Aruna Smith is a very pleasant 54 y.o. female who presents to the clinic with nausea, vomiting and diarrhea x 4 days.  Symptoms started fairly abruptly.  States emesis has since improved.  She went all day yesterday and has gone throughout the whole day today without any bouts of emesis.  Continues to feel slightly nauseous.  Diarrhea has remained persistent.  Currently experiencing approximately 3-4 episodes of diarrhea per day.  No associated blood or mucus in the stool.  Denies any abdominal pain.  Describes generalized abdominal uneasiness and churning.  Patient has not been running a fever.  She does feel fatigued and experiences occasional headaches.  Has been trying to increased fluids.  Denies any recent travel or antibiotic use.  No new foods.  No ill contacts.  No OTC medications have been started at this time.  Prior abdominal surgeries include hysterectomy.    REVIEW OF SYSTEMS  Review of Systems   Constitutional:  Positive for malaise/fatigue. Negative for chills, diaphoresis and fever.   HENT:  Negative for congestion and sore throat.    Respiratory:  Negative for cough.    Gastrointestinal:  Positive for diarrhea and nausea. Negative for abdominal pain and constipation.   Genitourinary:  Negative for dysuria, flank pain, frequency, hematuria and urgency.   Musculoskeletal:  Negative for myalgias.   Skin:  Negative for rash.   Neurological:  Positive for headaches. Negative for dizziness and tingling.       PAST MEDICAL HISTORY  Patient Active Problem List    Diagnosis Date Noted    History of 2019 novel coronavirus disease (COVID-19) 02/29/2024    Essential hypertension 02/29/2024    Dyslipidemia 02/29/2024    Retinal tear of right eye 03/28/2023    Prediabetes 03/28/2023    Psychophysiologic insomnia 08/23/2018    Hypercholesterolemia with  hypertriglyceridemia     GERD without esophagitis     Colitis, indeterminate     Obesity, Class II, BMI 35-39.9 (CMS-[facility]) 2014    Attention deficit disorder of adult with hyperactivity     Family history of diabetes mellitus type II     Seasonal allergic rhinitis     Seasonal allergies     Anxiety        SURGICAL HISTORY   has a past surgical history that includes vaginal hysterectomy total (10/29/2012) and cystoscopy (10/29/2012).    ALLERGIES  Allergies   Allergen Reactions    Quetiapine      Severe anger    Xanax [Alprazolam Xr]      shaking       CURRENT MEDICATIONS  Home Medications       Reviewed by Richard Davis P.A.-C. (Physician Assistant) on 24 at 1443  Med List Status: <None>     Medication Last Dose Status   albuterol 108 (90 Base) MCG/ACT Aero Soln inhalation aerosol Taking Active   benzonatate (TESSALON PERLES) 100 MG Cap Taking Active   buPROPion (WELLBUTRIN XL) 300 MG XL tablet Taking Active   cyclobenzaprine (FLEXERIL) 5 mg tablet Taking Active   diazePAM (VALIUM) 5 MG Tab Taking Active   famotidine (PEPCID) 20 MG Tab Taking Active   fluticasone (FLONASE ALLERGY RELIEF) 50 MCG/ACT nasal spray Taking Active   lisinopril (PRINIVIL) 5 MG Tab Taking Active   Loratadine (CLARITIN PO) Taking Active   meclizine (ANTIVERT) 25 MG Tab Taking Active   metFORMIN ER (GLUCOPHAGE XR) 500 MG TABLET SR 24 HR Taking Active   sertraline (ZOLOFT) 50 MG Tab Taking Active                    SOCIAL HISTORY  Social History     Tobacco Use    Smoking status: Former     Current packs/day: 0.00     Average packs/day: 0.5 packs/day for 4.0 years (2.0 ttl pk-yrs)     Types: Cigarettes     Start date: 1984     Quit date: 1988     Years since quittin.0    Smokeless tobacco: Never   Vaping Use    Vaping status: Never Used   Substance and Sexual Activity    Alcohol use: No     Alcohol/week: 0.0 oz     Comment: very rare, around twice a year    Drug use: No    Sexual activity: Yes     Partners: Male  "      FAMILY HISTORY  Family History   Problem Relation Age of Onset    Diabetes Mother     Hypertension Mother     Heart Disease Father         mitral valve replacement, rheumatic fever/congestive heart failure    Hypertension Brother     Heart Disease Brother 58         sudden MI    Hypertension Maternal Grandfather           Objective:     VITAL SIGNS: BP (!) 154/88 (BP Location: Right arm, Patient Position: Sitting, BP Cuff Size: Large adult)   Pulse (!) 115   Temp 36.3 °C (97.4 °F) (Temporal)   Resp 16   Ht 1.626 m (5' 4\")   Wt 97.5 kg (215 lb)   LMP 10/29/2012   SpO2 96%   BMI 36.90 kg/m²     PHYSICAL EXAM  Physical Exam  Constitutional:       General: She is not in acute distress.     Appearance: Normal appearance. She is not ill-appearing, toxic-appearing or diaphoretic.   HENT:      Head: Normocephalic and atraumatic.      Mouth/Throat:      Mouth: Mucous membranes are moist.      Pharynx: No oropharyngeal exudate or posterior oropharyngeal erythema.   Eyes:      Conjunctiva/sclera: Conjunctivae normal.   Cardiovascular:      Rate and Rhythm: Regular rhythm. Tachycardia present.      Pulses: Normal pulses.      Heart sounds: Normal heart sounds.   Pulmonary:      Effort: Pulmonary effort is normal.      Breath sounds: Normal breath sounds. No wheezing.   Abdominal:      General: Bowel sounds are normal. There is no distension.      Palpations: Abdomen is soft. There is no mass.      Tenderness: There is no abdominal tenderness. There is no right CVA tenderness, left CVA tenderness, guarding or rebound. Negative signs include Howard's sign, Rovsing's sign, McBurney's sign, psoas sign and obturator sign.      Hernia: No hernia is present.   Musculoskeletal:         General: Normal range of motion.      Cervical back: Normal range of motion. No muscular tenderness.   Lymphadenopathy:      Cervical: No cervical adenopathy.   Skin:     General: Skin is warm and dry.      Capillary Refill: " Capillary refill takes less than 2 seconds.   Neurological:      General: No focal deficit present.      Mental Status: She is alert and oriented to person, place, and time. Mental status is at baseline.   Psychiatric:         Mood and Affect: Mood normal.         Thought Content: Thought content normal.         Assessment/Plan:     1. Nausea vomiting and diarrhea  ondansetron (ZOFRAN ODT) 4 MG TABLET DISPERSIBLE          MDM/Comments:    This patient presents with nausea, vomiting & diarrhea. Differential diagnoses includes possible acute gastroenteritis. Abdominal exam without peritoneal signs. Currently no  evidence of dehydration. No diet changes to suggest food poisoning. No drinking water from possibly contaminated sources. No recent antibiotic use to suggest c. difficile.   No history or suggestion of inflammatory bowel disease, dietary changes, medication changes, irritable bowel syndrome. No evidence of obstruction. No evidence of surgical abdomen or other acute medical emergency including bowel obstruction, viscus perforation, vascular catastrophe, atypical appendicitis, acute cholecystitis at this time. Presentation not consistent with other acute, emergent causes of vomiting / diarrhea at this time. No indication for abdominal imaging. With no blood in the stool and given the timing, no indication for stool studies currently. At present, the diagnosis remains unclear, and patient will closely monitor to look for progression of symptoms requiring re-evaluation or Emergency Department evaluation.     Zofran provided for nausea.  Discussed a trial of Pepto/Imodium if needed for diarrhea.  Emmet diet and increase fluid and electrolyte intake encouraged.  Work note provided.    Differential diagnosis, natural history, supportive care, and indications for immediate follow-up discussed. All questions answered. Patient agrees with the plan of care.    Follow-up as needed if symptoms worsen or fail to improve to  PCP, Urgent care or Emergency Room.    I have personally reviewed prior external notes and test results pertinent to today's visit.  I have independently reviewed and interpreted all diagnostics ordered during this urgent care acute visit.   Discussed management options (risks,benefits, and alternatives to treatment). Pt expresses understanding and the treatment plan was agreed upon. Questions were encouraged and answered to pt's satisfaction.    Please note that this dictation was created using voice recognition software. I have made a reasonable attempt to correct obvious errors, but I expect that there are errors of grammar and possibly content that I did not discover before finalizing the note.

## 2024-11-10 DIAGNOSIS — F90.9 ATTENTION DEFICIT DISORDER OF ADULT WITH HYPERACTIVITY: ICD-10-CM

## 2024-11-10 DIAGNOSIS — K21.9 GERD WITHOUT ESOPHAGITIS: ICD-10-CM

## 2024-11-11 RX ORDER — FAMOTIDINE 20 MG/1
20 TABLET, FILM COATED ORAL 2 TIMES DAILY
Qty: 180 TABLET | Refills: 1 | Status: SHIPPED | OUTPATIENT
Start: 2024-11-11

## 2024-11-11 NOTE — TELEPHONE ENCOUNTER
Received request via: Pharmacy    Was the patient seen in the last year in this department? Yes    Does the patient have an active prescription (recently filled or refills available) for medication(s) requested? No    Pharmacy Name: [facility] [id number] - [place] - [address]     Does the patient have assisted Plus and need 100-day supply? (This applies to ALL medications) Patient does not have SCP

## 2024-11-26 DIAGNOSIS — F90.9 ATTENTION DEFICIT DISORDER OF ADULT WITH HYPERACTIVITY: ICD-10-CM

## 2024-11-26 RX ORDER — BUPROPION HYDROCHLORIDE 300 MG/1
300 TABLET ORAL EVERY MORNING
Qty: 90 TABLET | Refills: 1 | Status: SHIPPED | OUTPATIENT
Start: 2024-11-26

## 2024-11-26 NOTE — TELEPHONE ENCOUNTER
Received request via: Pharmacy    Was the patient seen in the last year in this department? Yes    Does the patient have an active prescription (recently filled or refills available) for medication(s) requested? No    Pharmacy Name:   [facility] [id number] - [place] - [address] [id number]  Phone: [phone] Fax: [phone]       Does the patient have USP Plus and need 100-day supply? (This applies to ALL medications) Patient does not have SCP

## 2025-01-15 ENCOUNTER — APPOINTMENT (OUTPATIENT)
Dept: MEDICAL GROUP | Facility: MEDICAL CENTER | Age: 55
End: 2025-01-15
Payer: COMMERCIAL

## 2025-02-19 ENCOUNTER — HOSPITAL ENCOUNTER (OUTPATIENT)
Dept: LAB | Facility: MEDICAL CENTER | Age: 55
End: 2025-02-19
Attending: FAMILY MEDICINE
Payer: COMMERCIAL

## 2025-02-19 DIAGNOSIS — R73.03 PREDIABETES: ICD-10-CM

## 2025-02-19 DIAGNOSIS — I10 ESSENTIAL HYPERTENSION: ICD-10-CM

## 2025-02-19 DIAGNOSIS — K21.9 GERD WITHOUT ESOPHAGITIS: ICD-10-CM

## 2025-02-19 DIAGNOSIS — E66.812 OBESITY, CLASS II, BMI 35-39.9: ICD-10-CM

## 2025-02-19 DIAGNOSIS — E78.5 DYSLIPIDEMIA: ICD-10-CM

## 2025-02-19 LAB
ALBUMIN SERPL BCP-MCNC: 4.4 G/DL (ref 3.2–4.9)
ALBUMIN/GLOB SERPL: 1.3 G/DL
ALP SERPL-CCNC: 101 U/L (ref 30–99)
ALT SERPL-CCNC: 18 U/L (ref 2–50)
ANION GAP SERPL CALC-SCNC: 11 MMOL/L (ref 7–16)
AST SERPL-CCNC: 20 U/L (ref 12–45)
BILIRUB SERPL-MCNC: 0.5 MG/DL (ref 0.1–1.5)
BUN SERPL-MCNC: 17 MG/DL (ref 8–22)
CALCIUM ALBUM COR SERPL-MCNC: 9.3 MG/DL (ref 8.5–10.5)
CALCIUM SERPL-MCNC: 9.6 MG/DL (ref 8.5–10.5)
CHLORIDE SERPL-SCNC: 108 MMOL/L (ref 96–112)
CHOLEST SERPL-MCNC: 183 MG/DL (ref 100–199)
CO2 SERPL-SCNC: 21 MMOL/L (ref 20–33)
CREAT SERPL-MCNC: 0.99 MG/DL (ref 0.5–1.4)
EST. AVERAGE GLUCOSE BLD GHB EST-MCNC: 137 MG/DL
GFR SERPLBLD CREATININE-BSD FMLA CKD-EPI: 67 ML/MIN/1.73 M 2
GLOBULIN SER CALC-MCNC: 3.3 G/DL (ref 1.9–3.5)
GLUCOSE SERPL-MCNC: 119 MG/DL (ref 65–99)
HBA1C MFR BLD: 6.4 % (ref 4–5.6)
HDLC SERPL-MCNC: 42 MG/DL
LDLC SERPL CALC-MCNC: 98 MG/DL
POTASSIUM SERPL-SCNC: 4.8 MMOL/L (ref 3.6–5.5)
PROT SERPL-MCNC: 7.7 G/DL (ref 6–8.2)
SODIUM SERPL-SCNC: 140 MMOL/L (ref 135–145)
TRIGL SERPL-MCNC: 216 MG/DL (ref 0–149)
TSH SERPL-ACNC: 1.84 UIU/ML (ref 0.35–5.5)

## 2025-02-19 PROCEDURE — 36415 COLL VENOUS BLD VENIPUNCTURE: CPT

## 2025-02-19 PROCEDURE — 83036 HEMOGLOBIN GLYCOSYLATED A1C: CPT

## 2025-02-19 PROCEDURE — 84443 ASSAY THYROID STIM HORMONE: CPT

## 2025-02-19 PROCEDURE — 80061 LIPID PANEL: CPT

## 2025-02-19 PROCEDURE — 80053 COMPREHEN METABOLIC PANEL: CPT

## 2025-03-01 ENCOUNTER — RESULTS FOLLOW-UP (OUTPATIENT)
Dept: MEDICAL GROUP | Facility: MEDICAL CENTER | Age: 55
End: 2025-03-01

## 2025-03-05 ENCOUNTER — APPOINTMENT (OUTPATIENT)
Dept: MEDICAL GROUP | Facility: MEDICAL CENTER | Age: 55
End: 2025-03-05
Payer: COMMERCIAL

## 2025-03-05 VITALS
HEART RATE: 89 BPM | HEIGHT: 64 IN | WEIGHT: 223 LBS | RESPIRATION RATE: 16 BRPM | SYSTOLIC BLOOD PRESSURE: 128 MMHG | DIASTOLIC BLOOD PRESSURE: 80 MMHG | TEMPERATURE: 97.8 F | BODY MASS INDEX: 38.07 KG/M2 | OXYGEN SATURATION: 93 %

## 2025-03-05 DIAGNOSIS — E78.1 HYPERTRIGLYCERIDEMIA WITHOUT HYPERCHOLESTEROLEMIA: ICD-10-CM

## 2025-03-05 DIAGNOSIS — Z86.69 HISTORY OF RETINAL TEAR: ICD-10-CM

## 2025-03-05 DIAGNOSIS — Z12.39 SCREENING BREAST EXAMINATION: ICD-10-CM

## 2025-03-05 DIAGNOSIS — E66.812 OBESITY, CLASS II, BMI 35-39.9: ICD-10-CM

## 2025-03-05 DIAGNOSIS — F90.9 ATTENTION DEFICIT DISORDER OF ADULT WITH HYPERACTIVITY: ICD-10-CM

## 2025-03-05 DIAGNOSIS — F51.04 PSYCHOPHYSIOLOGIC INSOMNIA: ICD-10-CM

## 2025-03-05 DIAGNOSIS — F41.9 ANXIETY: ICD-10-CM

## 2025-03-05 DIAGNOSIS — R73.02 IMPAIRED GLUCOSE TOLERANCE: ICD-10-CM

## 2025-03-05 DIAGNOSIS — K21.9 GERD WITHOUT ESOPHAGITIS: ICD-10-CM

## 2025-03-05 DIAGNOSIS — R73.03 PREDIABETES: ICD-10-CM

## 2025-03-05 PROBLEM — H33.311 RETINAL TEAR OF RIGHT EYE: Status: RESOLVED | Noted: 2023-03-28 | Resolved: 2025-03-05

## 2025-03-05 PROBLEM — E78.5 DYSLIPIDEMIA: Status: RESOLVED | Noted: 2024-02-29 | Resolved: 2025-03-05

## 2025-03-05 PROCEDURE — 3079F DIAST BP 80-89 MM HG: CPT | Performed by: FAMILY MEDICINE

## 2025-03-05 PROCEDURE — 99214 OFFICE O/P EST MOD 30 MIN: CPT | Performed by: FAMILY MEDICINE

## 2025-03-05 PROCEDURE — 3074F SYST BP LT 130 MM HG: CPT | Performed by: FAMILY MEDICINE

## 2025-03-05 RX ORDER — DIAZEPAM 5 MG/1
5 TABLET ORAL EVERY 8 HOURS PRN
Qty: 90 TABLET | Refills: 5 | Status: SHIPPED | OUTPATIENT
Start: 2025-03-05 | End: 2025-09-01

## 2025-03-05 RX ORDER — METFORMIN HYDROCHLORIDE 500 MG/1
500 TABLET, EXTENDED RELEASE ORAL EVERY EVENING
Qty: 90 TABLET | Refills: 3 | Status: SHIPPED | OUTPATIENT
Start: 2025-03-05

## 2025-03-05 RX ORDER — BUPROPION HYDROCHLORIDE 300 MG/1
300 TABLET ORAL EVERY MORNING
Qty: 90 TABLET | Refills: 3 | Status: SHIPPED | OUTPATIENT
Start: 2025-03-05

## 2025-03-05 RX ORDER — LISINOPRIL 5 MG/1
5 TABLET ORAL DAILY
Qty: 90 TABLET | Refills: 3 | Status: SHIPPED | OUTPATIENT
Start: 2025-03-05

## 2025-03-05 RX ORDER — FAMOTIDINE 20 MG/1
20 TABLET, FILM COATED ORAL 2 TIMES DAILY
Qty: 180 TABLET | Refills: 3 | Status: SHIPPED | OUTPATIENT
Start: 2025-03-05

## 2025-03-05 ASSESSMENT — PATIENT HEALTH QUESTIONNAIRE - PHQ9
5. POOR APPETITE OR OVEREATING: 1 - SEVERAL DAYS
CLINICAL INTERPRETATION OF PHQ2 SCORE: 1
SUM OF ALL RESPONSES TO PHQ QUESTIONS 1-9: 3

## 2025-03-06 NOTE — PROGRESS NOTES
Chief Complaint   Patient presents with    Follow-Up     Medication refills    ADHD    Prediabetes    Medication Refill       Subjective:     HPI:   Aruna Smith presents today with the followin. Attention deficit disorder of adult with hyperactivity  Patient states the combination of Wellbutrin and sertraline has been quite helpful in controlling her ADHD and helping her be effective at work.  Currently under significant personal stress but feels the medication regimen is still helpful.    2. Prediabetes/Impaired glucose tolerance  Patient has history of impaired glucose tolerance.  A1c obtained 2 weeks ago shows glycohemoglobin of 6.4%.  Patient has started making dietary changes since she saw those results.  In particular she is reducing sweets.    3. Hypertriglyceridemia without hypercholesterolemia  Lipid review shows excellent LDL and total cholesterol with mild triglyceride elevation.  Discussed with patient that triglyceride elevation is primarily from excess glucose, primarily from excess starches and sugars in the diet.    4. GERD without esophagitis  The patient feels the current medication regimen of famotidine twice daily is controlling the gastroesophageal reflux symptoms well. Denies dysphagia, reflux symptoms, acidity, abdominal pain or visible blood or mucus in the stool. Denies vomiting or hematemesis. Denies burping or abdominal bloating. Patient avoids nonsteroidal anti-inflammatory drugs. Avoids heavy meals or eating within 2 hours of bedtime.    5. History of retinal tear  Patient does have history of retinal tear and continues to follow with eye specialist, particularly a retinal specialist.    6. Anxiety/Psychophysiologic insomnia  Patient has chronic anxiety and anxiety related insomnia.  She has used diazepam to treat this issue for several years.  No adverse events have been reported or observed.  Generally she is using it only intermittently.  Lately she has had some increased  work stress and is having to use this regularly and would like a renewal.  PDMP review shows no inconsistencies and the medication is renewed.    7. Obesity, Class II, BMI 35-39.9 (CMS-[facility])  She has generally maintained her weight loss.  However, her weight has gone up slightly recently.  She feels this may be due to stress eating.  She is trying to improve her exercise and is definitely improving her diet.    8. Screening breast examination  Mammogram order discussed and placed        Patient Active Problem List    Diagnosis Date Noted    Impaired glucose tolerance 03/05/2025    Hypertriglyceridemia without hypercholesterolemia 03/05/2025    History of retinal tear 03/05/2025    History of 2019 novel coronavirus disease (COVID-19) 02/29/2024    Essential hypertension 02/29/2024    Prediabetes 03/28/2023    Psychophysiologic insomnia 08/23/2018    GERD without esophagitis     Colitis, indeterminate     Obesity, Class II, BMI 35-39.9 (CMS-[facility]CC) 07/30/2014    Attention deficit disorder of adult with hyperactivity     Family history of diabetes mellitus type II     Seasonal allergic rhinitis     Seasonal allergies     Anxiety        Current medicines (including changes today)  Current Outpatient Medications   Medication Sig Dispense Refill    buPROPion (WELLBUTRIN XL) 300 MG XL tablet Take 1 Tablet by mouth every morning. 90 Tablet 3    lisinopril (PRINIVIL) 5 MG Tab Take 1 Tablet by mouth every day. 90 Tablet 3    famotidine (PEPCID) 20 MG Tab Take 1 Tablet by mouth 2 times a day. 180 Tablet 3    metFORMIN ER (GLUCOPHAGE XR) 500 MG TABLET SR 24 HR Take 1 Tablet by mouth every evening. 90 Tablet 3    sertraline (ZOLOFT) 50 MG Tab Take 1 Tablet by mouth every day. 90 Tablet 3    diazePAM (VALIUM) 5 MG Tab Take 1 Tablet by mouth every 8 hours as needed for Anxiety for up to 180 days. 90 tablets is a 30 day quantity 90 Tablet 5    cyclobenzaprine (FLEXERIL) 5 mg tablet TAKE ONE TABLET BY MOUTH THREE TIMES A DAY AS NEEDED  "FOR MUSCLE SPASMS 90 Tablet 5    albuterol 108 (90 Base) MCG/ACT Aero Soln inhalation aerosol Inhale 2 Puffs every 6 hours as needed for Shortness of Breath. 8.5 g 2    fluticasone (FLONASE ALLERGY RELIEF) 50 MCG/ACT nasal spray Spray 1 Spray in nose 2 times a day. 16 g 0    Loratadine (CLARITIN PO) Take  by mouth.       No current facility-administered medications for this visit.       Allergies   Allergen Reactions    Quetiapine      Severe anger    Xanax [Alprazolam Xr]      shaking       ROS: As per HPI       Objective:     /80 (BP Location: Left arm, Patient Position: Standing, BP Cuff Size: Large adult)   Pulse 89   Temp 36.6 °C (97.8 °F) (Temporal)   Resp 16   Ht 1.626 m (5' 4\")   Wt 101 kg (223 lb)   SpO2 93%  Body mass index is 38.28 kg/m².    Physical Exam:  Constitutional: Well-developed and well-nourished. Not diaphoretic. No distress. Lucid and fluent.  Skin: Skin is warm and dry. No rash noted.  Head: Atraumatic without lesions.  Eyes: Conjunctivae and extraocular motions are normal. Pupils are equal, round, and reactive to light. No scleral icterus.   Ears:  External ears unremarkable.   Neck: Supple, trachea midline. No thyromegaly present. No cervical or supraclavicular lymphadenopathy. No JVD or carotid bruits appreciated  Cardiovascular: Regular rate and rhythm.  Normal S1, S2 without murmur appreciated.  Chest: Effort normal. Clear to auscultation throughout. No adventitious sounds.   Abdomen: Soft, non tender, and without distention. Active bowel sounds in all four quadrants. No rebound, guarding, masses or hepatosplenomegaly.  Extremities: No cyanosis, clubbing, erythema, nor edema.   Neurological: Alert and oriented x 3. No tremor appreciated.  Psychiatric:  Behavior, mood, and affect are appropriate.    Lab results from 2/19/2025 reviewed and discussed     Assessment and Plan:     54 y.o. female with the following issues:    1. Attention deficit disorder of adult with " hyperactivity  buPROPion (WELLBUTRIN XL) 300 MG XL tablet    sertraline (ZOLOFT) 50 MG Tab      2. Prediabetes  lisinopril (PRINIVIL) 5 MG Tab    metFORMIN ER (GLUCOPHAGE XR) 500 MG TABLET SR 24 HR      3. Impaired glucose tolerance  metFORMIN ER (GLUCOPHAGE XR) 500 MG TABLET SR 24 HR      4. Hypertriglyceridemia without hypercholesterolemia        5. GERD without esophagitis  famotidine (PEPCID) 20 MG Tab      6. History of retinal tear        7. Anxiety  diazePAM (VALIUM) 5 MG Tab      8. Psychophysiologic insomnia  diazePAM (VALIUM) 5 MG Tab      9. Obesity, Class II, BMI 35-39.9 (CMS-[facility])        10. Screening breast examination  MA-SCREENING MAMMO BILAT W/TOMOSYNTHESIS W/CAD            Followup: Return in about 6 months (around 9/5/2025), or if symptoms worsen or fail to improve.

## 2025-03-17 ENCOUNTER — APPOINTMENT (OUTPATIENT)
Dept: RADIOLOGY | Facility: MEDICAL CENTER | Age: 55
End: 2025-03-17
Attending: FAMILY MEDICINE
Payer: COMMERCIAL

## 2025-03-17 DIAGNOSIS — Z12.39 SCREENING BREAST EXAMINATION: ICD-10-CM

## 2025-03-17 PROCEDURE — 77067 SCR MAMMO BI INCL CAD: CPT

## 2025-03-20 ENCOUNTER — RESULTS FOLLOW-UP (OUTPATIENT)
Dept: MEDICAL GROUP | Facility: MEDICAL CENTER | Age: 55
End: 2025-03-20

## 2025-07-07 DIAGNOSIS — M62.830 MUSCLE SPASM OF BACK: ICD-10-CM

## 2025-07-07 RX ORDER — CYCLOBENZAPRINE HCL 5 MG
5 TABLET ORAL 3 TIMES DAILY PRN
Qty: 90 TABLET | Refills: 5 | Status: SHIPPED | OUTPATIENT
Start: 2025-07-07

## 2025-10-16 ENCOUNTER — APPOINTMENT (OUTPATIENT)
Dept: MEDICAL GROUP | Facility: MEDICAL CENTER | Age: 55
End: 2025-10-16
Payer: COMMERCIAL